# Patient Record
Sex: MALE | Race: WHITE | NOT HISPANIC OR LATINO | Employment: OTHER | URBAN - METROPOLITAN AREA
[De-identification: names, ages, dates, MRNs, and addresses within clinical notes are randomized per-mention and may not be internally consistent; named-entity substitution may affect disease eponyms.]

---

## 2017-01-05 ENCOUNTER — OFFICE VISIT (OUTPATIENT)
Dept: ENDOCRINOLOGY | Facility: CLINIC | Age: 82
End: 2017-01-05
Payer: MEDICARE

## 2017-01-05 VITALS
BODY MASS INDEX: 27.55 KG/M2 | WEIGHT: 207.88 LBS | DIASTOLIC BLOOD PRESSURE: 68 MMHG | SYSTOLIC BLOOD PRESSURE: 138 MMHG | HEIGHT: 73 IN | HEART RATE: 77 BPM

## 2017-01-05 DIAGNOSIS — I25.10 CORONARY ARTERY DISEASE, ANGINA PRESENCE UNSPECIFIED, UNSPECIFIED VESSEL OR LESION TYPE, UNSPECIFIED WHETHER NATIVE OR TRANSPLANTED HEART: ICD-10-CM

## 2017-01-05 DIAGNOSIS — G63 POLYNEUROPATHY ASSOCIATED WITH UNDERLYING DISEASE: ICD-10-CM

## 2017-01-05 DIAGNOSIS — E78.5 HYPERLIPIDEMIA, UNSPECIFIED HYPERLIPIDEMIA TYPE: ICD-10-CM

## 2017-01-05 DIAGNOSIS — E11.49 TYPE II DIABETES MELLITUS WITH NEUROLOGICAL MANIFESTATIONS: Primary | ICD-10-CM

## 2017-01-05 PROCEDURE — 99213 OFFICE O/P EST LOW 20 MIN: CPT | Mod: S$PBB,,, | Performed by: NURSE PRACTITIONER

## 2017-01-05 PROCEDURE — 99213 OFFICE O/P EST LOW 20 MIN: CPT | Mod: PBBFAC,PO | Performed by: NURSE PRACTITIONER

## 2017-01-05 PROCEDURE — 99999 PR PBB SHADOW E&M-EST. PATIENT-LVL III: CPT | Mod: PBBFAC,,, | Performed by: NURSE PRACTITIONER

## 2017-01-05 RX ORDER — HYDROCODONE BITARTRATE AND ACETAMINOPHEN 7.5; 325 MG/1; MG/1
TABLET ORAL
COMMUNITY
Start: 2016-12-09 | End: 2017-01-05 | Stop reason: SDUPTHER

## 2017-01-05 NOTE — MR AVS SNAPSHOT
[facility]  [address]  [facility] [address]  Phone: [phone]  Fax: [phone]                  William Mac   2017 9:30 AM   Office Visit    Description:  Male : 1935   Provider:  CLEMENTINA Verdugo,ANP-C   Department:  [facility]           Reason for Visit     Diabetes Mellitus           Diagnoses this Visit        Comments    Type II diabetes mellitus with neurological manifestations    -  Primary     Coronary artery disease, angina presence unspecified, unspecified vessel or lesion type, unspecified whether native or transplanted heart         Hyperlipidemia, unspecified hyperlipidemia type                To Do List           Goals (5 Years of Data)     None      [facility]     [facility] Nurse Care [facility] -  Assistance  Registered nurses in the [facility] provide clinical advisement, health education, appointment booking, and other advisory services.  Call for this free service at [phone].             Medications           Message regarding Medications     Verify the changes and/or additions to your medication regime listed below are the same as discussed with your clinician today.  If any of these changes or additions are incorrect, please notify your healthcare provider.        STOP taking these medications     cyanocobalamin, vitamin B-12, (VITAMIN B-12) 50 mcg tablet Take 2,500 mcg by mouth once daily.    hydrocodone-acetaminophen 7.5-325mg (NORCO) 7.5-325 mg per tablet     metformin (GLUCOPHAGE) 1000 MG tablet Take 1 tablet (1,000 mg total) by mouth 2 (two) times daily.    tramadol (ULTRAM) 50 mg tablet Take 50 mg by mouth every 6 (six) hours as needed for Pain.            Verify that the below list of medications is an accurate representation of the medications you are currently taking.  If none reported, the list may be blank. If incorrect, please contact your healthcare provider. Carry this list with you  "in case of emergency.           Current Medications     aspirin (ECOTRIN) 81 MG EC tablet Take 81 mg by mouth every other day.     cetirizine (ZYRTEC) 10 MG tablet Take 10 mg by mouth once daily.    docusate sodium (COLACE) 100 MG capsule Take 100 mg by mouth once daily.      hydrocodone-acetaminophen 5-325mg (NORCO) 5-325 mg per tablet Take 1 tablet by mouth every 6 (six) hours as needed for Pain.    IRON/MULTIVITS,STRESS FORMULA (MULTIVITAMIN, STRESS FORMULA ORAL) Take 1 tablet by mouth once daily.      pravastatin (PRAVACHOL) 40 MG tablet Take 1 tablet (40 mg total) by mouth nightly.    senna (SENOKOT) 8.6 mg tablet Take 2 tablets by mouth once daily.    sertraline (ZOLOFT) 50 MG tablet TAKE ONE TABLET BY MOUTH DAILY.           Clinical Reference Information           Vital Signs - Last Recorded  Most recent update: 1/5/2017  9:28 AM by Jessica Stahl LPN    BP Pulse Ht Wt BMI    138/68 (BP Location: Right arm, Patient Position: Sitting, BP Method: Manual) 77 6' 1" (1.854 m) 94.3 kg (207 lb 14.3 oz) 27.43 kg/m2      Blood Pressure          Most Recent Value    BP  138/68      Allergies as of 1/5/2017     Adhesive      Immunizations Administered on Date of Encounter - 1/5/2017     None      "

## 2017-01-05 NOTE — PROGRESS NOTES
Subjective:       Patient ID: William Mac is a 81 y.o. male.    Chief Complaint:  reconsulted for Uncontrolled DM.     HPI  Pt is a very pleasant 80 y/o wm with TYpe 2 DM since early 1990's, and chronic conditions pending review including  HLP, CAD. Does not have HTN--takes ACE for renal prophylaxis.  No c/o hypoglycemia symptoms.  He is only taking metformin.   BIggest issue is chronic back pain with prior stimulator placement.     Interim Events:    No acute events.  Back pain and now neck pain continues.  Rarely checks glucoses.  Memory loss is a concern but wife says its about the same as last visit.       On metformin 1000 bid,       Review of Systems   Constitutional: Negative for activity change, appetite change, fatigue and unexpected weight change.   HENT: Positive for hearing loss. Negative for trouble swallowing.    Eyes: Negative for photophobia and visual disturbance.   Respiratory: Negative for cough and shortness of breath.    Cardiovascular: Negative for chest pain, palpitations and leg swelling.   Gastrointestinal: Negative for constipation and diarrhea.   Genitourinary: Negative for difficulty urinating and frequency.   Musculoskeletal: Positive for back pain and neck stiffness. Negative for arthralgias and joint swelling.   Skin: Negative for rash and wound.   Neurological: Negative for dizziness, syncope, weakness and numbness.   Psychiatric/Behavioral: Negative for agitation. The patient is not nervous/anxious.        Objective:      Physical Exam   Constitutional: He is oriented to person, place, and time. He appears well-developed and well-nourished. No distress.   Appears approx age, well groomed, NAD   HENT:   Head: Normocephalic and atraumatic.   Nose: Nose normal.   Mouth/Throat: Oropharynx is clear and moist.   Eyes: Conjunctivae and EOM are normal. Pupils are equal, round, and reactive to light.   Neck: Normal range of motion. Neck supple. No tracheal deviation present. No thyromegaly  present.   Cardiovascular: Normal rate, regular rhythm, normal heart sounds and intact distal pulses.  Exam reveals no friction rub.    No murmur heard.  Pulmonary/Chest: Effort normal and breath sounds normal. No respiratory distress. He has no wheezes.   Musculoskeletal: Normal range of motion. He exhibits no edema.   Ambulates with cane.     Feet: no open wounds or calluses. Good pedal care     Protective Sensation (w/ 10 gram monofilament):  Right: Decreased  Left: Decreased    Visual Inspection:  Normal -  Bilateral    Pedal Pulses:   Right: Diminshed  Left: Diminshed    Posterior tibialis:   Right:Absent  Left: Absent     Lymphadenopathy:     He has no cervical adenopathy.   Neurological: He is alert and oriented to person, place, and time. He has normal reflexes. No cranial nerve deficit. He exhibits normal muscle tone. Coordination normal.   Vibratory sensation to feet intact bilaterally    Skin: Skin is warm and dry. No rash noted. He is not diaphoretic. No erythema.   Psychiatric: He has a normal mood and affect. His behavior is normal. Judgment and thought content normal.       Hemoglobin A1C   Date Value Ref Range Status   12/30/2016 7.4 (H) 4.5 - 6.2 % Final     Comment:     According to ADA guidelines, hemoglobin A1C <7.0% represents  optimal control in non-pregnant diabetic patients.  Different  metrics may apply to specific populations.   Standards of Medical Care in Diabetes - 2016.  For the purpose of screening for the presence of diabetes:  <5.7%     Consistent with the absence of diabetes  5.7-6.4%  Consistent with increasing risk for diabetes   (prediabetes)  >or=6.5%  Consistent with diabetes  Currently no consensus exists for use of hemoglobin A1C  for diagnosis of diabetes for children.     09/16/2016 7.1 (H) 4.5 - 6.2 % Final     Comment:     According to ADA guidelines, hemoglobin A1C <7.0% represents  optimal control in non-pregnant diabetic patients.  Different  metrics may apply to  specific populations.   Standards of Medical Care in Diabetes - 2016.  For the purpose of screening for the presence of diabetes:  <5.7%     Consistent with the absence of diabetes  5.7-6.4%  Consistent with increasing risk for diabetes   (prediabetes)  >or=6.5%  Consistent with diabetes  Currently no consensus exists for use of hemoglobin A1C  for diagnosis of diabetes for children.     08/07/2016 6.4 (H) 0.0 - 5.6 % Final     Comment:     Reference Interval:  5.0 - 5.6 Normal   5.7 - 6.4 High Risk   > 6.5 Diabetic    Hgb A1c results are standardized based on the (NGSP) National   Glycohemoglobin Standardization Program.    Hemoglobin A1C levels are related to mean serum/plasma glucose   during the preceding 2-3 months.            Chemistry        Component Value Date/Time     08/12/2016 0908    K 4.5 08/12/2016 0908     08/12/2016 0908    CO2 29 08/12/2016 0908    BUN 9 08/12/2016 0908    CREATININE 0.9 08/12/2016 0908     (H) 08/12/2016 0908        Component Value Date/Time    CALCIUM 10.0 08/12/2016 0908    ALKPHOS 61 08/12/2016 0908    AST 22 08/12/2016 0908    AST 29 01/14/2016 1135    ALT 24 08/12/2016 0908    BILITOT 0.7 08/12/2016 0908        Lab Results   Component Value Date    LDLCALC Invalid, Trig>400.0 08/07/2016     Lab Results   Component Value Date    TSH 1.441 05/20/2014       Assessment:     1. Type II diabetes mellitus with neurological manifestations  Chronic-stable-no change   2. Coronary artery disease, angina presence unspecified, unspecified vessel or lesion type, unspecified whether native or transplanted heart  -chronic-stable-avoid hypoglycemai    3. Hyperlipidemia, unspecified hyperlipidemia type  -chronic-stable-cont staing    4.       Peripheral neuropathy ----------chronic-stable-monitor foot care.       Plan:     continue metformin  1000 bid,  Cr 0.9.         ORDERS  1/5/17  A1C only in April same day as his wife--Cailin Mac     7 mo with a1c prior

## 2017-01-09 ENCOUNTER — TELEPHONE (OUTPATIENT)
Dept: INTERNAL MEDICINE | Facility: CLINIC | Age: 82
End: 2017-01-09

## 2017-01-09 RX ORDER — LORATADINE 10 MG/1
10 TABLET ORAL DAILY
Qty: 90 TABLET | Refills: 0 | Status: SHIPPED | OUTPATIENT
Start: 2017-01-09 | End: 2017-04-24 | Stop reason: SDUPTHER

## 2017-01-19 NOTE — TELEPHONE ENCOUNTER
Called and spoke with patient wife and informed her that medication claritin has been sent to pharmacy she verbalized that she understood she states that she needed the syringes not William

## 2017-02-09 PROBLEM — M54.9 BACK PAIN: Status: ACTIVE | Noted: 2017-02-09

## 2017-04-05 DIAGNOSIS — E11.9 TYPE 2 DIABETES MELLITUS WITHOUT COMPLICATION, WITHOUT LONG-TERM CURRENT USE OF INSULIN: Primary | ICD-10-CM

## 2017-04-24 DIAGNOSIS — F32.A DEPRESSION: ICD-10-CM

## 2017-04-26 RX ORDER — LORATADINE 10 MG/1
TABLET ORAL
Qty: 90 TABLET | Refills: 0 | Status: SHIPPED | OUTPATIENT
Start: 2017-04-26 | End: 2017-07-30 | Stop reason: SDUPTHER

## 2017-04-26 RX ORDER — SERTRALINE HYDROCHLORIDE 50 MG/1
TABLET, FILM COATED ORAL
Qty: 15 TABLET | Refills: 0 | Status: SHIPPED | OUTPATIENT
Start: 2017-04-26 | End: 2017-05-11 | Stop reason: SDUPTHER

## 2017-05-10 DIAGNOSIS — F32.A DEPRESSION: ICD-10-CM

## 2017-05-10 DIAGNOSIS — E78.5 HYPERLIPIDEMIA: ICD-10-CM

## 2017-05-10 RX ORDER — SERTRALINE HYDROCHLORIDE 50 MG/1
TABLET, FILM COATED ORAL
Qty: 15 TABLET | Refills: 0 | Status: CANCELLED | OUTPATIENT
Start: 2017-05-10

## 2017-05-10 RX ORDER — PRAVASTATIN SODIUM 40 MG/1
TABLET ORAL
Qty: 90 TABLET | Refills: 2 | Status: SHIPPED | OUTPATIENT
Start: 2017-05-10 | End: 2017-08-17 | Stop reason: SDUPTHER

## 2017-05-11 ENCOUNTER — OFFICE VISIT (OUTPATIENT)
Dept: INTERNAL MEDICINE | Facility: CLINIC | Age: 82
End: 2017-05-11
Payer: MEDICARE

## 2017-05-11 VITALS
HEART RATE: 74 BPM | BODY MASS INDEX: 27.61 KG/M2 | OXYGEN SATURATION: 95 % | HEIGHT: 73 IN | DIASTOLIC BLOOD PRESSURE: 70 MMHG | RESPIRATION RATE: 16 BRPM | WEIGHT: 208.31 LBS | SYSTOLIC BLOOD PRESSURE: 118 MMHG

## 2017-05-11 DIAGNOSIS — E11.69 HYPERLIPIDEMIA DUE TO TYPE 2 DIABETES MELLITUS: Primary | ICD-10-CM

## 2017-05-11 DIAGNOSIS — F32.A DEPRESSION, UNSPECIFIED DEPRESSION TYPE: ICD-10-CM

## 2017-05-11 DIAGNOSIS — G89.4 CHRONIC PAIN SYNDROME: ICD-10-CM

## 2017-05-11 DIAGNOSIS — Z95.1 S/P CABG (CORONARY ARTERY BYPASS GRAFT): ICD-10-CM

## 2017-05-11 DIAGNOSIS — E78.5 HYPERLIPIDEMIA DUE TO TYPE 2 DIABETES MELLITUS: Primary | ICD-10-CM

## 2017-05-11 DIAGNOSIS — D69.6 THROMBOCYTOPENIA: ICD-10-CM

## 2017-05-11 PROCEDURE — 99214 OFFICE O/P EST MOD 30 MIN: CPT | Mod: S$PBB,,, | Performed by: INTERNAL MEDICINE

## 2017-05-11 PROCEDURE — 99213 OFFICE O/P EST LOW 20 MIN: CPT | Mod: PBBFAC,PO | Performed by: INTERNAL MEDICINE

## 2017-05-11 PROCEDURE — 99999 PR PBB SHADOW E&M-EST. PATIENT-LVL III: CPT | Mod: PBBFAC,,, | Performed by: INTERNAL MEDICINE

## 2017-05-11 RX ORDER — SERTRALINE HYDROCHLORIDE 50 MG/1
50 TABLET, FILM COATED ORAL DAILY
Qty: 90 TABLET | Refills: 1 | Status: SHIPPED | OUTPATIENT
Start: 2017-05-11 | End: 2017-08-17 | Stop reason: SDUPTHER

## 2017-05-11 RX ORDER — HYDROCODONE BITARTRATE AND ACETAMINOPHEN 7.5; 325 MG/1; MG/1
TABLET ORAL
COMMUNITY
Start: 2017-04-26 | End: 2017-05-11

## 2017-05-11 NOTE — MR AVS SNAPSHOT
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    -  Primary     Thrombocytopenia         Depression, unspecified depression type         S/P CABG (coronary artery bypass graft)         Chronic pain syndrome                To Do List           Future Appointments        Provider Department Dept Phone    2017 8:30 AM LAB, [place] [facility] [phone]    2017 10:00 AM LAB, [place] [facility] [phone]    2017 9:30 AM CLEMENTINA Verdugo,ANP-C [facility] [phone]    2017 11:00 AM Quita Thompson MD [facility] [phone]      Goals (5 Years of Data)     None      Follow-Up and Disposition     Return in about 6 months (around 2017).       These Medications        Disp Refills Start End    sertraline (ZOLOFT) 50 MG tablet 90 tablet 1 2017     Take 1 tablet (50 mg total) by mouth once daily. - Oral    Pharmacy: [id number] [address] [place] [address], [address]  #: [phone]         [facility]     [facility] Nurse Care [facility] -  Assistance  Unless otherwise directed by your provider, please contact [facility], our nurse care line that is available for  assistance.     Registered nurses in the [facility] provide: appointment scheduling, clinical advisement, health education, and other advisory services.  Call: [phone] (toll free)               Medications           Message regarding Medications     Verify the changes and/or additions to your  "medication regime listed below are the same as discussed with your clinician today.  If any of these changes or additions are incorrect, please notify your healthcare provider.        CHANGE how you are taking these medications     Start Taking Instead of    sertraline (ZOLOFT) 50 MG tablet sertraline (ZOLOFT) 50 MG tablet    Dosage:  Take 1 tablet (50 mg total) by mouth once daily. Dosage:  TAKE ONE TABLET BY MOUTH DAILY.    Reason for Change:  Reorder       STOP taking these medications     hydrocodone-acetaminophen 7.5-325mg (NORCO) 7.5-325 mg per tablet            Verify that the below list of medications is an accurate representation of the medications you are currently taking.  If none reported, the list may be blank. If incorrect, please contact your healthcare provider. Carry this list with you in case of emergency.           Current Medications     aspirin (ECOTRIN) 81 MG EC tablet Take 81 mg by mouth every other day.     docusate sodium (COLACE) 100 MG capsule Take 100 mg by mouth once daily.      hydrocodone-acetaminophen 5-325mg (NORCO) 5-325 mg per tablet Take 1 tablet by mouth every 6 (six) hours as needed for Pain.    IRON/MULTIVITS,STRESS FORMULA (MULTIVITAMIN, STRESS FORMULA ORAL) Take 1 tablet by mouth once daily.      loratadine (CLARITIN) 10 mg tablet TAKE ONE TABLET BY MOUTH DAILY.    metformin (GLUCOPHAGE) 1000 MG tablet Take 1,000 mg by mouth 2 (two) times daily with meals.    pravastatin (PRAVACHOL) 40 MG tablet TAKE ONE TABLET BY MOUTH NIGHTLY    senna (SENOKOT) 8.6 mg tablet Take 2 tablets by mouth once daily.    sertraline (ZOLOFT) 50 MG tablet Take 1 tablet (50 mg total) by mouth once daily.           Clinical Reference Information           Your Vitals Were     BP Pulse Resp Height Weight SpO2    118/70 74 16 6' 1" (1.854 m) 94.5 kg (208 lb 5.4 oz) 95%    BMI                27.49 kg/m2          Blood Pressure          Most Recent Value    BP  118/70      Allergies as of 5/11/2017     " Adhesive      Immunizations Administered on Date of Encounter - 5/11/2017     None      Orders Placed During Today's Visit      Normal Orders This Visit    Ambulatory referral to Cardiology     Microalbumin/creatinine urine ratio     Future Labs/Procedures Expected by Expires    C-reactive protein  5/11/2017 7/10/2018    CBC auto differential  5/11/2017 8/9/2017    Comprehensive metabolic panel  5/11/2017 8/9/2017    Hemoglobin A1c  5/11/2017 8/9/2017    Lipid panel  5/11/2017 5/12/2018    Sedimentation rate, manual  5/11/2017 8/9/2017    TSH  5/11/2017 5/12/2018      Language Assistance Services     ATTENTION: Language assistance services are available, free of charge. Please call [phone].      ATENCIÓN: Si habla dana, tiene a alvarez disposición servicios gratuitos de asistencia lingüística. Llame al [phone].     CHÚ Ý: N?u b?n nói Ti?ng Vi?t, có các d?ch v? h? tr? ngôn ng? mi?n phí dành cho b?n. G?i s? [phone].         [facility] complies with applicable Federal civil rights laws and does not discriminate on the basis of race, color, national origin, age, disability, or sex.

## 2017-05-11 NOTE — PROGRESS NOTES
HISTORY OF PRESENT ILLNESS:  Pt. is a 81 y.o. male presents for monitoring of his DM Type 2 with neuropathy, HTN, mixed hyperlipdiemia, CAD.  Does not have HTN--takes ACE for renal prophylaxis.  Eye exam is with Dr. Drew, wife says they are making appt. Soon.  HE is still having problems with his back stimulator.  States there sertraline iis working well.    Lab Results   Component Value Date    WBC 5.46 02/09/2017    HGB 14.8 02/09/2017    HCT 43.6 02/09/2017     (L) 02/09/2017    CHOL 150 08/07/2016    TRIG 541 (H) 08/07/2016    HDL 25 (L) 08/07/2016    ALT 24 08/12/2016    AST 22 08/12/2016     08/12/2016    K 4.5 08/12/2016     08/12/2016    CREATININE 0.9 08/12/2016    BUN 9 08/12/2016    CO2 29 08/12/2016    TSH 1.441 05/20/2014    PSA 1.01 05/22/2013    INR 1.0 02/09/2017    GLUF 185 (H) 03/12/2007    HGBA1C 7.4 (H) 12/30/2016     Lab Results   Component Value Date    LDLCALC Invalid, Trig>400.0 08/07/2016             ROS:  GENERAL: No fever, chills. positive fatigability; no weight loss.  SKIN: No rashes, itching or changes in color or texture of skin.  HEAD: No headaches or recent head trauma.  EARS: Denies ear pain, discharge or vertigo.  NOSE: No loss of smell, no epistaxis or postnasal drip.  MOUTH & THROAT: No hoarseness or change in voice. No excessive gum bleeding.  NODES: Denies swollen glands.  CHEST: Denies DEL VALLE, cyanosis, wheezing, cough and sputum production.  CARDIOVASCULAR: Denies chest pain, PND, orthopnea or reduced exercise tolerance.  ABDOMEN: Appetite fine. No weight loss. Denies constipation, diarrhea, abdominal pain, hematemesis or blood in stool.  URINARY: No flank pain, dysuria or hematuria.  PERIPHERAL VASCULAR: No claudication or cyanosis. No edema.  MUSCULOSKELETAL: No joint stiffness or swelling. Positive back pain.  NEUROLOGIC: Denies numbness    PE:   Vitals:   Vitals:    05/11/17 1705   Pulse: 74   Resp: 16     GENERAL: no acute distress, A&Ox3,  comfortable.  Male with BMI of 27   HEENT: tympanic membranes clear, nasal mucosa pink, no pharyngeal erythema or exudate  NECK: supple, no cervical lymphadenopathy, no thyromegaly; no supraclavicular nodes;   CHEST:  clear to auscultation bilaterally, no crackles or wheeze; no increased work of breathing;  CARDIOVASCULAR: regular rate and rhythm, no rubs, murmurs or gallops.  ABDOMEN: normal bowel sounds, soft non-tender, non-distended; no palpable organomegaly;   EXT: no clubbing, cyanosis or edema.     ASSESSMENT/PLAN:    Hyperlipidemia due to type 2 diabetes mellitus  -     Hemoglobin A1c; Future; Expected date: 5/11/17  -     Comprehensive metabolic panel; Future; Expected date: 5/11/17  -     Lipid panel; Future; Expected date: 5/11/17  -     Microalbumin/creatinine urine ratio    Thrombocytopenia  -     CBC auto differential; Future; Expected date: 5/11/17    Depression, unspecified depression type  -     TSH; Future; Expected date: 5/11/17  -     sertraline (ZOLOFT) 50 MG tablet; Take 1 tablet (50 mg total) by mouth once daily.  Dispense: 90 tablet; Refill: 1    S/P CABG (coronary artery bypass graft)  -     Ambulatory referral to Cardiology    Chronic pain syndrome  -     Sedimentation rate, manual; Future; Expected date: 5/11/17  -     C-reactive protein; Future; Expected date: 5/11/17      Call if condition changes or worsens.

## 2017-05-14 PROBLEM — D69.6 THROMBOCYTOPENIA: Status: ACTIVE | Noted: 2017-05-14

## 2017-05-14 PROBLEM — E11.69 HYPERLIPIDEMIA DUE TO TYPE 2 DIABETES MELLITUS: Status: ACTIVE | Noted: 2017-05-14

## 2017-05-14 PROBLEM — G89.4 CHRONIC PAIN SYNDROME: Status: ACTIVE | Noted: 2017-05-14

## 2017-05-14 PROBLEM — E78.5 HYPERLIPIDEMIA DUE TO TYPE 2 DIABETES MELLITUS: Status: ACTIVE | Noted: 2017-05-14

## 2017-05-16 ENCOUNTER — LAB VISIT (OUTPATIENT)
Dept: LAB | Facility: HOSPITAL | Age: 82
End: 2017-05-16
Attending: INTERNAL MEDICINE
Payer: MEDICARE

## 2017-05-16 DIAGNOSIS — D69.6 THROMBOCYTOPENIA: ICD-10-CM

## 2017-05-16 DIAGNOSIS — G89.4 CHRONIC PAIN SYNDROME: ICD-10-CM

## 2017-05-16 DIAGNOSIS — E11.69 HYPERLIPIDEMIA DUE TO TYPE 2 DIABETES MELLITUS: ICD-10-CM

## 2017-05-16 DIAGNOSIS — F32.A DEPRESSION, UNSPECIFIED DEPRESSION TYPE: ICD-10-CM

## 2017-05-16 DIAGNOSIS — E78.5 HYPERLIPIDEMIA DUE TO TYPE 2 DIABETES MELLITUS: ICD-10-CM

## 2017-05-16 LAB
ALBUMIN SERPL BCP-MCNC: 4 G/DL
ALP SERPL-CCNC: 53 U/L
ALT SERPL W/O P-5'-P-CCNC: 22 U/L
ANION GAP SERPL CALC-SCNC: 11 MMOL/L
AST SERPL-CCNC: 26 U/L
BASOPHILS # BLD AUTO: 0.02 K/UL
BASOPHILS NFR BLD: 0.4 %
BILIRUB SERPL-MCNC: 0.7 MG/DL
BUN SERPL-MCNC: 10 MG/DL
CALCIUM SERPL-MCNC: 9.6 MG/DL
CHLORIDE SERPL-SCNC: 106 MMOL/L
CHOLEST/HDLC SERPL: 4.9 {RATIO}
CO2 SERPL-SCNC: 27 MMOL/L
CREAT SERPL-MCNC: 0.9 MG/DL
CRP SERPL-MCNC: 0.4 MG/L
DIFFERENTIAL METHOD: ABNORMAL
EOSINOPHIL # BLD AUTO: 0.4 K/UL
EOSINOPHIL NFR BLD: 7.1 %
ERYTHROCYTE [DISTWIDTH] IN BLOOD BY AUTOMATED COUNT: 13 %
ERYTHROCYTE [SEDIMENTATION RATE] IN BLOOD BY WESTERGREN METHOD: 1 MM/HR
EST. GFR  (AFRICAN AMERICAN): >60 ML/MIN/1.73 M^2
EST. GFR  (NON AFRICAN AMERICAN): >60 ML/MIN/1.73 M^2
GLUCOSE SERPL-MCNC: 130 MG/DL
HCT VFR BLD AUTO: 44.7 %
HDL/CHOLESTEROL RATIO: 20.6 %
HDLC SERPL-MCNC: 136 MG/DL
HDLC SERPL-MCNC: 28 MG/DL
HGB BLD-MCNC: 15.4 G/DL
LDLC SERPL CALC-MCNC: 68 MG/DL
LYMPHOCYTES # BLD AUTO: 1.7 K/UL
LYMPHOCYTES NFR BLD: 31.1 %
MCH RBC QN AUTO: 32.5 PG
MCHC RBC AUTO-ENTMCNC: 34.5 %
MCV RBC AUTO: 94 FL
MONOCYTES # BLD AUTO: 0.6 K/UL
MONOCYTES NFR BLD: 11.5 %
NEUTROPHILS # BLD AUTO: 2.7 K/UL
NEUTROPHILS NFR BLD: 49.5 %
NONHDLC SERPL-MCNC: 108 MG/DL
PLATELET # BLD AUTO: 124 K/UL
PMV BLD AUTO: 9.3 FL
POTASSIUM SERPL-SCNC: 4.3 MMOL/L
PROT SERPL-MCNC: 7.1 G/DL
RBC # BLD AUTO: 4.74 M/UL
SODIUM SERPL-SCNC: 144 MMOL/L
TRIGL SERPL-MCNC: 200 MG/DL
TSH SERPL DL<=0.005 MIU/L-ACNC: 1.58 UIU/ML
WBC # BLD AUTO: 5.47 K/UL

## 2017-05-16 PROCEDURE — 86140 C-REACTIVE PROTEIN: CPT

## 2017-05-16 PROCEDURE — 83036 HEMOGLOBIN GLYCOSYLATED A1C: CPT

## 2017-05-16 PROCEDURE — 85025 COMPLETE CBC W/AUTO DIFF WBC: CPT

## 2017-05-16 PROCEDURE — 36415 COLL VENOUS BLD VENIPUNCTURE: CPT | Mod: PO

## 2017-05-16 PROCEDURE — 80053 COMPREHEN METABOLIC PANEL: CPT

## 2017-05-16 PROCEDURE — 80061 LIPID PANEL: CPT

## 2017-05-16 PROCEDURE — 85651 RBC SED RATE NONAUTOMATED: CPT | Mod: PO

## 2017-05-16 PROCEDURE — 84443 ASSAY THYROID STIM HORMONE: CPT

## 2017-05-17 LAB
ESTIMATED AVG GLUCOSE: 166 MG/DL
HBA1C MFR BLD HPLC: 7.4 %

## 2017-05-18 ENCOUNTER — TELEPHONE (OUTPATIENT)
Dept: ADMINISTRATIVE | Facility: HOSPITAL | Age: 82
End: 2017-05-18

## 2017-05-18 NOTE — LETTER
June 1, 2017    Dr Louis Drew             [facility]  [address]  [facility] [address]  Phone: [phone] June 1, 2017     Patient: William Mac    YOB: 1935   Date of Visit: 5/18/2017       To Whom It May Concern:                                                                                    [facility]    We are seeing William Mac in the clinic today at [facility].  Quita Thompson MD is their PCP.  She/He has an outstanding lab/procedure at this time when reviewing their chart.  To help with our Health Maintenance records will you please supply the following:      [x]  Eye Exam    (3rd request)                                     Please Fax to [facility] at [phone]    Thank you for your help, Itzel Morales LPN-GERALD.  If I can be of any assistance you can call at [phone]        [facility]         If you have any questions or concerns, please don't hesitate to call.    Sincerely,        Quita Thompson MD

## 2017-05-18 NOTE — LETTER
May 22, 2017    Dr Louis Drew             [facility]  [address]  [facility] [address]  Phone: [phone] May 22, 2017     Patient: William Mac    YOB: 1935   Date of Visit: 5/18/2017       To Whom It May Concern:                                                                                    [facility]    We are seeing William Mac in the clinic today at [facility].  Quita Thompson MD is their PCP.  She/He has an outstanding lab/procedure at this time when reviewing their chart.  To help with our Health Maintenance records will you please supply the following:          [x]  Eye Exam                                            Please Fax to [facility] at [phone]    Thank you for your help, Itzel Morales LPN-[facility].  If I can be of any assistance you can call at [phone]        [facility]         If you have any questions or concerns, please don't hesitate to call.    Sincerely,        Quita Thompson MD

## 2017-05-18 NOTE — LETTER
May 18, 2017    Dr Louis Drew             [facility]  [address]  [facility] [address]  Phone: [phone] May 18, 2017     Patient: William Mac    YOB: 1935   Date of Visit: 5/18/2017       To Whom It May Concern:                                                                                    [facility]    We are seeing William Mac in the clinic today at [facility].  Quita Thompson MD is their PCP.  She/He has an outstanding lab/procedure at this time when reviewing their chart.  To help with our Health Maintenance records will you please supply the following:      [x]  Eye Exam                                      Please Fax to [facility] at [phone]    Thank you for your help, Itzel Morales LPN-[facility].  If I can be of any assistance you can call at [phone]        [facility]         If you have any questions or concerns, please don't hesitate to call.    Sincerely,        Quita Thompson MD

## 2017-07-31 ENCOUNTER — OFFICE VISIT (OUTPATIENT)
Dept: CARDIOLOGY | Facility: CLINIC | Age: 82
End: 2017-07-31
Payer: MEDICARE

## 2017-07-31 VITALS
HEART RATE: 74 BPM | BODY MASS INDEX: 27.37 KG/M2 | SYSTOLIC BLOOD PRESSURE: 110 MMHG | DIASTOLIC BLOOD PRESSURE: 60 MMHG | WEIGHT: 207.44 LBS

## 2017-07-31 DIAGNOSIS — E78.5 TYPE 2 DIABETES MELLITUS WITH HYPERLIPIDEMIA: ICD-10-CM

## 2017-07-31 DIAGNOSIS — Z95.1 S/P CABG (CORONARY ARTERY BYPASS GRAFT): ICD-10-CM

## 2017-07-31 DIAGNOSIS — E11.69 HYPERLIPIDEMIA DUE TO TYPE 2 DIABETES MELLITUS: ICD-10-CM

## 2017-07-31 DIAGNOSIS — I63.9 CEREBROVASCULAR ACCIDENT (CVA), UNSPECIFIED MECHANISM: ICD-10-CM

## 2017-07-31 DIAGNOSIS — E78.5 HYPERLIPIDEMIA DUE TO TYPE 2 DIABETES MELLITUS: ICD-10-CM

## 2017-07-31 DIAGNOSIS — I73.9 PVD (PERIPHERAL VASCULAR DISEASE): ICD-10-CM

## 2017-07-31 DIAGNOSIS — E11.69 TYPE 2 DIABETES MELLITUS WITH HYPERLIPIDEMIA: ICD-10-CM

## 2017-07-31 DIAGNOSIS — I25.10 CORONARY ARTERY DISEASE, ANGINA PRESENCE UNSPECIFIED, UNSPECIFIED VESSEL OR LESION TYPE, UNSPECIFIED WHETHER NATIVE OR TRANSPLANTED HEART: Primary | ICD-10-CM

## 2017-07-31 PROCEDURE — 1126F AMNT PAIN NOTED NONE PRSNT: CPT | Mod: ,,, | Performed by: INTERNAL MEDICINE

## 2017-07-31 PROCEDURE — 99999 PR PBB SHADOW E&M-EST. PATIENT-LVL II: CPT | Mod: PBBFAC,,, | Performed by: INTERNAL MEDICINE

## 2017-07-31 PROCEDURE — 1159F MED LIST DOCD IN RCRD: CPT | Mod: ,,, | Performed by: INTERNAL MEDICINE

## 2017-07-31 PROCEDURE — 1157F ADVNC CARE PLAN IN RCRD: CPT | Mod: ,,, | Performed by: INTERNAL MEDICINE

## 2017-07-31 PROCEDURE — 99214 OFFICE O/P EST MOD 30 MIN: CPT | Mod: S$PBB,,, | Performed by: INTERNAL MEDICINE

## 2017-07-31 PROCEDURE — 99212 OFFICE O/P EST SF 10 MIN: CPT | Mod: PBBFAC,PO | Performed by: INTERNAL MEDICINE

## 2017-07-31 NOTE — LETTER
July 31, 2017      Quita Thompson MD  [address] [facility] [place] [address]           [facility]  [address] [place] [address]  Phone: [phone]          Patient: William Mac   MR Number: [id number]   YOB: 1935   Date of Visit: 7/31/2017       Dear Dr. Quita Thompson:    Thank you for referring William Mac to me for evaluation. Attached you will find relevant portions of my assessment and plan of care.    If you have questions, please do not hesitate to call me. I look forward to following William Mac along with you.    Sincerely,    Ha Heredia MD    Enclosure  CC:  No Recipients    If you would like to receive this communication electronically, please contact [email or web address] or [phone] to request more information on [employer] [facility] access.    For providers and/or their staff who would like to refer a patient to [facility], please contact us through our one-stop-shop provider referral line, [facility], at [phone].    If you feel you have received this communication in error or would no longer like to receive these types of communications, please e-mail [email or web address]

## 2017-08-01 RX ORDER — LORATADINE 10 MG/1
TABLET ORAL
Qty: 90 TABLET | Refills: 0 | Status: SHIPPED | OUTPATIENT
Start: 2017-08-01 | End: 2017-08-17 | Stop reason: SDUPTHER

## 2017-08-04 ENCOUNTER — TELEPHONE (OUTPATIENT)
Dept: CARDIOLOGY | Facility: CLINIC | Age: 82
End: 2017-08-04

## 2017-08-04 ENCOUNTER — HOSPITAL ENCOUNTER (OUTPATIENT)
Dept: RADIOLOGY | Facility: HOSPITAL | Age: 82
Discharge: HOME | End: 2017-08-04
Attending: INTERNAL MEDICINE
Payer: MEDICARE

## 2017-08-04 DIAGNOSIS — I63.9 CEREBROVASCULAR ACCIDENT (CVA), UNSPECIFIED MECHANISM: ICD-10-CM

## 2017-08-04 PROCEDURE — 70470 CT HEAD/BRAIN W/O & W/DYE: CPT | Mod: 26,,, | Performed by: RADIOLOGY

## 2017-08-04 PROCEDURE — 25500020 PHARM REV CODE 255: Mod: PO | Performed by: INTERNAL MEDICINE

## 2017-08-04 PROCEDURE — 70470 CT HEAD/BRAIN W/O & W/DYE: CPT | Mod: TC,PO

## 2017-08-04 RX ADMIN — IOHEXOL 75 ML: 350 INJECTION, SOLUTION INTRAVENOUS at 08:08

## 2017-08-04 NOTE — TELEPHONE ENCOUNTER
----- Message from Ha Heredia MD sent at 8/4/2017  9:52 AM CDT -----  No evidence recent stroke  Follow up with pcp

## 2017-08-09 ENCOUNTER — HOSPITAL ENCOUNTER (OUTPATIENT)
Dept: RADIOLOGY | Facility: HOSPITAL | Age: 82
Discharge: HOME | End: 2017-08-09
Attending: INTERNAL MEDICINE
Payer: MEDICARE

## 2017-08-09 ENCOUNTER — TELEPHONE (OUTPATIENT)
Dept: FAMILY MEDICINE | Facility: CLINIC | Age: 82
End: 2017-08-09

## 2017-08-09 ENCOUNTER — OFFICE VISIT (OUTPATIENT)
Dept: INTERNAL MEDICINE | Facility: CLINIC | Age: 82
End: 2017-08-09
Payer: MEDICARE

## 2017-08-09 VITALS
OXYGEN SATURATION: 92 % | SYSTOLIC BLOOD PRESSURE: 112 MMHG | RESPIRATION RATE: 18 BRPM | DIASTOLIC BLOOD PRESSURE: 70 MMHG | HEART RATE: 84 BPM | WEIGHT: 205.5 LBS | BODY MASS INDEX: 27.11 KG/M2

## 2017-08-09 DIAGNOSIS — R29.898 WEAKNESS OF LEFT HIP: Primary | ICD-10-CM

## 2017-08-09 DIAGNOSIS — R26.9 NEUROLOGIC GAIT DYSFUNCTION: ICD-10-CM

## 2017-08-09 DIAGNOSIS — R29.898 WEAKNESS OF LEFT HIP: ICD-10-CM

## 2017-08-09 PROCEDURE — 73502 X-RAY EXAM HIP UNI 2-3 VIEWS: CPT | Mod: 26,LT,, | Performed by: RADIOLOGY

## 2017-08-09 PROCEDURE — 99999 PR PBB SHADOW E&M-EST. PATIENT-LVL IV: CPT | Mod: PBBFAC,,, | Performed by: INTERNAL MEDICINE

## 2017-08-09 PROCEDURE — 3078F DIAST BP <80 MM HG: CPT | Mod: ,,, | Performed by: INTERNAL MEDICINE

## 2017-08-09 PROCEDURE — 1159F MED LIST DOCD IN RCRD: CPT | Mod: ,,, | Performed by: INTERNAL MEDICINE

## 2017-08-09 PROCEDURE — 1126F AMNT PAIN NOTED NONE PRSNT: CPT | Mod: ,,, | Performed by: INTERNAL MEDICINE

## 2017-08-09 PROCEDURE — 73502 X-RAY EXAM HIP UNI 2-3 VIEWS: CPT | Mod: TC,PO,LT

## 2017-08-09 PROCEDURE — 1157F ADVNC CARE PLAN IN RCRD: CPT | Mod: ,,, | Performed by: INTERNAL MEDICINE

## 2017-08-09 PROCEDURE — 3008F BODY MASS INDEX DOCD: CPT | Mod: ,,, | Performed by: INTERNAL MEDICINE

## 2017-08-09 PROCEDURE — 3074F SYST BP LT 130 MM HG: CPT | Mod: ,,, | Performed by: INTERNAL MEDICINE

## 2017-08-09 PROCEDURE — 99213 OFFICE O/P EST LOW 20 MIN: CPT | Mod: S$PBB,,, | Performed by: INTERNAL MEDICINE

## 2017-08-09 RX ORDER — PRAVASTATIN SODIUM 40 MG/1
TABLET ORAL
COMMUNITY
Start: 2017-05-10 | End: 2017-12-27 | Stop reason: SDUPTHER

## 2017-08-09 RX ORDER — SENNOSIDES 8.6 MG/1
2 TABLET ORAL DAILY PRN
COMMUNITY
End: 2020-01-01 | Stop reason: SDUPTHER

## 2017-08-09 RX ORDER — LORATADINE 10 MG/1
TABLET ORAL
COMMUNITY
Start: 2017-08-01 | End: 2017-11-15 | Stop reason: SDUPTHER

## 2017-08-09 RX ORDER — HYDROCODONE BITARTRATE AND ACETAMINOPHEN 7.5; 325 MG/1; MG/1
TABLET ORAL
COMMUNITY
Start: 2017-08-04 | End: 2018-08-01 | Stop reason: DRUGHIGH

## 2017-08-09 RX ORDER — HYDROCODONE BITARTRATE AND ACETAMINOPHEN 5; 325 MG/1; MG/1
1 TABLET ORAL EVERY 8 HOURS PRN
COMMUNITY
End: 2020-01-01

## 2017-08-09 RX ORDER — METFORMIN HYDROCHLORIDE 1000 MG/1
1000 TABLET ORAL
COMMUNITY
End: 2017-11-02 | Stop reason: SDUPTHER

## 2017-08-09 RX ORDER — SERTRALINE HYDROCHLORIDE 50 MG/1
50 TABLET, FILM COATED ORAL
COMMUNITY
Start: 2017-05-11 | End: 2017-12-08 | Stop reason: SDUPTHER

## 2017-08-09 RX ORDER — ASPIRIN 81 MG/1
81 TABLET ORAL EVERY OTHER DAY
COMMUNITY
End: 2020-01-01

## 2017-08-09 RX ORDER — DOCUSATE SODIUM 100 MG/1
100 CAPSULE, LIQUID FILLED ORAL
COMMUNITY
End: 2018-11-14

## 2017-08-09 NOTE — PROGRESS NOTES
"HISTORY OF PRESENT ILLNESS:  Pt. is a 81 y.o. male presents after his cardiologist ordered a CT of his head, due to unsteady gait/left leg weakness recently.  He described weak hip flexors.  He had hip replacement done 9/06 at [facility].    "  There is mild ventricular megaly most likely limited central atrophy.  There is no evidence of abnormal enhancement.  There is patchy sphenoid sinus mucosal thickening.  There is no significant change relative to the 03/09/2014 exam.   Impression      No acute intracranial findings in this patient with extensive white matter changes and suggestion of central atrophy.    Patchy ethmoid and sphenoid sinus mucosal thickening is evident which was not seen on the 03/09/2014 exam."           ROS:  GENERAL: No fever, chills, fatigability or weight loss.  SKIN: No rashes, itching or changes in color or texture of skin.  HEAD: No headaches or recent head trauma.  EARS: Denies ear pain, discharge or vertigo.  NOSE: No loss of smell, no epistaxis or postnasal drip.  MOUTH & THROAT: No hoarseness or change in voice. No excessive gum bleeding.  NODES: Denies swollen glands.  CHEST: Denies DEL VALLE, cyanosis, wheezing, cough and sputum production.  CARDIOVASCULAR: Denies chest pain, PND, orthopnea or reduced exercise tolerance.  ABDOMEN: Appetite fine. No weight loss. Denies constipation, diarrhea, abdominal pain, hematemesis or blood in stool.  URINARY: No flank pain, dysuria or hematuria.  PERIPHERAL VASCULAR: No claudication or cyanosis. No edema.  MUSCULOSKELETAL: Positive joint stiffness to left hip; no swelling. Denies back pain.  NEUROLOGIC: Denies numbness; gait dysfunction; feels it difficult to lift left leg/feels heavy;    PE:   Vitals:   Vitals:    08/09/17 1657   Pulse: 84   Resp: 18     GENERAL: no acute distress, A&Ox3, comfortable.  Male with BMI of 27   HEENT: tympanic membranes clear, nasal mucosa pink, no pharyngeal erythema or exudate  NECK: supple, no cervical lymphadenopathy, no " thyromegaly; no supraclavicular nodes;   CHEST:  clear to auscultation bilaterally, no crackles or wheeze; no increased work of breathing;  CARDIOVASCULAR: regular rate and rhythm, no rubs, murmurs or gallops.  ABDOMEN: normal bowel sounds, soft non-tender, non-distended; no palpable organomegaly;   EXT: no clubbing, cyanosis or edema; positive weakness hip flexor on left;    ASSESSMENT/PLAN:    Weakness of left hip  -     X-Ray Hip 2 View Left; Future; Expected date: 08/09/2017  -     Ambulatory consult to Physical Therapy  -     Ambulatory referral to Neurology    Neurologic gait dysfunction  -     Ambulatory consult to Physical Therapy  -     Ambulatory referral to Neurology      Call if condition changes or worsens.

## 2017-08-11 NOTE — TELEPHONE ENCOUNTER
----- Message from Kenya Marques sent at 8/11/2017  1:52 PM CDT -----  Contact: spouse  Returning call, results  Call back on # [phone]  thanks

## 2017-08-12 PROBLEM — R26.9 NEUROLOGIC GAIT DYSFUNCTION: Status: ACTIVE | Noted: 2017-08-12

## 2017-08-12 PROBLEM — R29.898 WEAKNESS OF LEFT HIP: Status: ACTIVE | Noted: 2017-08-12

## 2017-08-17 ENCOUNTER — OFFICE VISIT (OUTPATIENT)
Dept: ENDOCRINOLOGY | Facility: CLINIC | Age: 82
End: 2017-08-17
Payer: MEDICARE

## 2017-08-17 VITALS
DIASTOLIC BLOOD PRESSURE: 70 MMHG | BODY MASS INDEX: 27.39 KG/M2 | HEART RATE: 64 BPM | RESPIRATION RATE: 24 BRPM | WEIGHT: 206.63 LBS | SYSTOLIC BLOOD PRESSURE: 120 MMHG | HEIGHT: 73 IN

## 2017-08-17 DIAGNOSIS — E78.5 HYPERLIPIDEMIA, UNSPECIFIED HYPERLIPIDEMIA TYPE: ICD-10-CM

## 2017-08-17 DIAGNOSIS — L60.0 INGROWN TOENAIL: ICD-10-CM

## 2017-08-17 DIAGNOSIS — I25.10 CORONARY ARTERY DISEASE, ANGINA PRESENCE UNSPECIFIED, UNSPECIFIED VESSEL OR LESION TYPE, UNSPECIFIED WHETHER NATIVE OR TRANSPLANTED HEART: ICD-10-CM

## 2017-08-17 DIAGNOSIS — E11.49 TYPE II DIABETES MELLITUS WITH NEUROLOGICAL MANIFESTATIONS: Primary | ICD-10-CM

## 2017-08-17 DIAGNOSIS — G63 POLYNEUROPATHY ASSOCIATED WITH UNDERLYING DISEASE: ICD-10-CM

## 2017-08-17 PROCEDURE — 99999 PR PBB SHADOW E&M-EST. PATIENT-LVL IV: CPT | Mod: PBBFAC,,, | Performed by: NURSE PRACTITIONER

## 2017-08-17 PROCEDURE — 1126F AMNT PAIN NOTED NONE PRSNT: CPT | Mod: ,,, | Performed by: NURSE PRACTITIONER

## 2017-08-17 PROCEDURE — 99214 OFFICE O/P EST MOD 30 MIN: CPT | Mod: PBBFAC,PO | Performed by: NURSE PRACTITIONER

## 2017-08-17 PROCEDURE — 99214 OFFICE O/P EST MOD 30 MIN: CPT | Mod: S$PBB,,, | Performed by: NURSE PRACTITIONER

## 2017-08-17 PROCEDURE — 1159F MED LIST DOCD IN RCRD: CPT | Mod: ,,, | Performed by: NURSE PRACTITIONER

## 2017-08-17 PROCEDURE — 1157F ADVNC CARE PLAN IN RCRD: CPT | Mod: ,,, | Performed by: NURSE PRACTITIONER

## 2017-08-17 PROCEDURE — 3078F DIAST BP <80 MM HG: CPT | Mod: ,,, | Performed by: NURSE PRACTITIONER

## 2017-08-17 PROCEDURE — 3074F SYST BP LT 130 MM HG: CPT | Mod: ,,, | Performed by: NURSE PRACTITIONER

## 2017-08-17 NOTE — PROGRESS NOTES
Subjective:       Patient ID: William Mac is a 81 y.o. male.    Chief Complaint:  reconsulted for Uncontrolled DM.     HPI  Pt is a very pleasant 78 y/o wm with TYpe 2 DM since early 1990's, and chronic conditions pending review including  HLP, CAD. Does not have HTN--takes ACE for renal prophylaxis.  No c/o hypoglycemia symptoms.  He is only taking metformin.   BIggest issue is chronic back pain with prior stimulator placement.     Interim Events:    No acute events.  Back pain and now neck pain continues.  Rarely checks glucoses.  Memory loss is a concern but wife says its about the same as last visit.       On metformin 1000 bid,       Review of Systems   Constitutional: Negative for activity change, appetite change, fatigue and unexpected weight change.   HENT: Positive for hearing loss. Negative for trouble swallowing.    Eyes: Negative for photophobia and visual disturbance.   Respiratory: Negative for cough and shortness of breath.    Cardiovascular: Negative for chest pain, palpitations and leg swelling.   Gastrointestinal: Negative for constipation and diarrhea.   Genitourinary: Negative for difficulty urinating and frequency.   Musculoskeletal: Positive for back pain and neck stiffness. Negative for arthralgias and joint swelling.   Skin: Negative for rash and wound.   Neurological: Negative for dizziness, syncope, weakness and numbness.   Psychiatric/Behavioral: Negative for agitation. The patient is not nervous/anxious.        Objective:      Physical Exam   Constitutional: He is oriented to person, place, and time. He appears well-developed and well-nourished. No distress.   Appears approx age, well groomed, NAD   HENT:   Head: Normocephalic and atraumatic.   Nose: Nose normal.   Mouth/Throat: Oropharynx is clear and moist.   Eyes: Conjunctivae and EOM are normal. Pupils are equal, round, and reactive to light.   Neck: Normal range of motion. Neck supple. No tracheal deviation present. No thyromegaly  present.   Cardiovascular: Normal rate, regular rhythm, normal heart sounds and intact distal pulses.  Exam reveals no friction rub.    No murmur heard.  Pulmonary/Chest: Effort normal and breath sounds normal. No respiratory distress. He has no wheezes.   Musculoskeletal: Normal range of motion. He exhibits no edema.   Ambulates with cane.     L great toe showing propensity to ingrow and he has been chiseling out with possibly a pocketknife--counseled against.     Feet: no open wounds or calluses. Good pedal care     Protective Sensation (w/ 10 gram monofilament):  Right: Decreased  Left: Decreased    Visual Inspection:  Normal -  Bilateral    Pedal Pulses:   Right: Diminshed  Left: Diminshed    Posterior tibialis:   Right:Absent  Left: Absent     Lymphadenopathy:     He has no cervical adenopathy.   Neurological: He is alert and oriented to person, place, and time. He has normal reflexes. No cranial nerve deficit. He exhibits normal muscle tone. Coordination normal.   Vibratory sensation to feet intact bilaterally    Skin: Skin is warm and dry. No rash noted. He is not diaphoretic. No erythema.   Psychiatric: He has a normal mood and affect. His behavior is normal. Judgment and thought content normal.       Hemoglobin A1C   Date Value Ref Range Status   08/04/2017 7.1 (H) 4.0 - 5.6 % Final     Comment:     According to ADA guidelines, hemoglobin A1c <7.0% represents  optimal control in non-pregnant diabetic patients. Different  metrics may apply to specific patient populations.   Standards of Medical Care in Diabetes-2016.  For the purpose of screening for the presence of diabetes:  <5.7%     Consistent with the absence of diabetes  5.7-6.4%  Consistent with increasing risk for diabetes   (prediabetes)  >or=6.5%  Consistent with diabetes  Currently, no consensus exists for use of hemoglobin A1c  for diagnosis of diabetes for children.  This Hemoglobin A1c assay has significant interference with fetal   hemoglobin    (HbF). The results are invalid for patients with abnormal amounts of   HbF,   including those with known Hereditary Persistence   of Fetal Hemoglobin. Heterozygous hemoglobin variants (HbAS, HbAC,   HbAD, HbAE, HbA2) do not significantly interfere with this assay;   however, presence of multiple variants in a sample may impact the %   interference.     05/16/2017 7.4 (H) 4.5 - 6.2 % Final     Comment:     According to ADA guidelines, hemoglobin A1C <7.0% represents  optimal control in non-pregnant diabetic patients.  Different  metrics may apply to specific populations.   Standards of Medical Care in Diabetes - 2016.  For the purpose of screening for the presence of diabetes:  <5.7%     Consistent with the absence of diabetes  5.7-6.4%  Consistent with increasing risk for diabetes   (prediabetes)  >or=6.5%  Consistent with diabetes  Currently no consensus exists for use of hemoglobin A1C  for diagnosis of diabetes for children.     12/30/2016 7.4 (H) 4.5 - 6.2 % Final     Comment:     According to ADA guidelines, hemoglobin A1C <7.0% represents  optimal control in non-pregnant diabetic patients.  Different  metrics may apply to specific populations.   Standards of Medical Care in Diabetes - 2016.  For the purpose of screening for the presence of diabetes:  <5.7%     Consistent with the absence of diabetes  5.7-6.4%  Consistent with increasing risk for diabetes   (prediabetes)  >or=6.5%  Consistent with diabetes  Currently no consensus exists for use of hemoglobin A1C  for diagnosis of diabetes for children.         Chemistry        Component Value Date/Time     05/16/2017 0921    K 4.3 05/16/2017 0921     05/16/2017 0921    CO2 27 05/16/2017 0921    BUN 10 05/16/2017 0921    CREATININE 0.9 08/04/2017 0755     (H) 05/16/2017 0921        Component Value Date/Time    CALCIUM 9.6 05/16/2017 0921    ALKPHOS 53 (L) 05/16/2017 0921    AST 26 05/16/2017 0921    AST 29 01/14/2016 1135    ALT 22  05/16/2017 0921    BILITOT 0.7 05/16/2017 0921        Lab Results   Component Value Date    LDLCALC 68.0 05/16/2017     Lab Results   Component Value Date    TSH 1.578 05/16/2017       Assessment:     1. Type II diabetes mellitus with neurological manifestations  Chronic-stable-no change   2. Coronary artery disease, angina presence unspecified, unspecified vessel or lesion type, unspecified whether native or transplanted heart  -chronic-stable-avoid hypoglycemai    3. Hyperlipidemia, unspecified hyperlipidemia type  -chronic-stable-cont staing    4.       Peripheral neuropathy ----------chronic-stable-monitor foot care.   5.       Ingrown toenail--new diagnosis--high risk with his self debridement--advised against.      Plan:     continue metformin  1000 bid,  Cr 0.9.         ORDERS   8/17/17  A1C only rosalee day as his wife--Cailin Mac     6  mo with a1c prior    Cons podiatry --ingrown toenail.

## 2017-09-18 ENCOUNTER — TELEPHONE (OUTPATIENT)
Dept: NEUROLOGY | Facility: CLINIC | Age: 82
End: 2017-09-18

## 2017-09-18 ENCOUNTER — OFFICE VISIT (OUTPATIENT)
Dept: NEUROLOGY | Facility: CLINIC | Age: 82
End: 2017-09-18
Payer: MEDICARE

## 2017-09-18 VITALS
HEART RATE: 67 BPM | SYSTOLIC BLOOD PRESSURE: 120 MMHG | RESPIRATION RATE: 18 BRPM | BODY MASS INDEX: 27.43 KG/M2 | HEIGHT: 73 IN | WEIGHT: 207 LBS | DIASTOLIC BLOOD PRESSURE: 63 MMHG

## 2017-09-18 DIAGNOSIS — R20.2 NUMBNESS AND TINGLING IN BOTH HANDS: ICD-10-CM

## 2017-09-18 DIAGNOSIS — R26.0 ATAXIC GAIT: ICD-10-CM

## 2017-09-18 DIAGNOSIS — R20.0 NUMBNESS IN BOTH LEGS: Primary | ICD-10-CM

## 2017-09-18 DIAGNOSIS — R20.0 NUMBNESS AND TINGLING IN BOTH HANDS: ICD-10-CM

## 2017-09-18 PROCEDURE — 1159F MED LIST DOCD IN RCRD: CPT | Mod: ,,, | Performed by: PSYCHIATRY & NEUROLOGY

## 2017-09-18 PROCEDURE — 3078F DIAST BP <80 MM HG: CPT | Mod: ,,, | Performed by: PSYCHIATRY & NEUROLOGY

## 2017-09-18 PROCEDURE — 99204 OFFICE O/P NEW MOD 45 MIN: CPT | Mod: S$PBB,,, | Performed by: PSYCHIATRY & NEUROLOGY

## 2017-09-18 PROCEDURE — 1157F ADVNC CARE PLAN IN RCRD: CPT | Mod: ,,, | Performed by: PSYCHIATRY & NEUROLOGY

## 2017-09-18 PROCEDURE — 3074F SYST BP LT 130 MM HG: CPT | Mod: ,,, | Performed by: PSYCHIATRY & NEUROLOGY

## 2017-09-18 PROCEDURE — 99213 OFFICE O/P EST LOW 20 MIN: CPT | Mod: PBBFAC,PN | Performed by: PSYCHIATRY & NEUROLOGY

## 2017-09-18 PROCEDURE — 99999 PR PBB SHADOW E&M-EST. PATIENT-LVL III: CPT | Mod: PBBFAC,,, | Performed by: PSYCHIATRY & NEUROLOGY

## 2017-09-18 PROCEDURE — 1126F AMNT PAIN NOTED NONE PRSNT: CPT | Mod: ,,, | Performed by: PSYCHIATRY & NEUROLOGY
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
## 2017-09-18 NOTE — PATIENT INSTRUCTIONS
I suspect the main reason you have difficulty with walking and falls is because of severe neuropathy in your feet.     Plan:  -- get nerve conduction study to clarify what kind of neuropathy you have (to determine if blood tests are needed)  -- refer to physical therapy for gait training (to learn how to compensate for numb feet)  -- obtain rolling walker with chair for added stability while out of the home  -- return to clinic after nerve conduction study completed

## 2017-09-18 NOTE — LETTER
September 18, 2017      Quita Thompson MD  [address] [facility] [address] [place] [address]           [facility]  [address] [facility] [address] [place] [address]  Phone: [phone]  Fax: [phone]          Patient: William Mac   MR Number: [id number]   YOB: 1935   Date of Visit: 9/18/2017       Dear Dr. Quita Thompson:    Thank you for referring William Mac to me for evaluation. Attached you will find relevant portions of my assessment and plan of care.    If you have questions, please do not hesitate to call me. I look forward to following William Mac along with you.    Sincerely,    Yaneth Gomez MD    Enclosure  CC:  No Recipients    If you would like to receive this communication electronically, please contact [email or web address] or [phone] to request more information on [facility] Link access.    For providers and/or their staff who would like to refer a patient to [facility], please contact us through our one-stop-shop provider referral line, [facility], at [phone].    If you feel you have received this communication in error or would no longer like to receive these types of communications, please e-mail [email or web address]

## 2017-09-19 ENCOUNTER — TELEPHONE (OUTPATIENT)
Dept: NEUROLOGY | Facility: CLINIC | Age: 82
End: 2017-09-19

## 2017-09-27 ENCOUNTER — OFFICE VISIT (OUTPATIENT)
Dept: FAMILY MEDICINE | Facility: CLINIC | Age: 82
End: 2017-09-27
Payer: MEDICARE

## 2017-09-27 VITALS
WEIGHT: 205.94 LBS | BODY MASS INDEX: 27.29 KG/M2 | SYSTOLIC BLOOD PRESSURE: 126 MMHG | HEIGHT: 73 IN | HEART RATE: 62 BPM | DIASTOLIC BLOOD PRESSURE: 64 MMHG

## 2017-09-27 DIAGNOSIS — Z48.02 ENCOUNTER FOR REMOVAL OF SUTURES: Primary | ICD-10-CM

## 2017-09-27 DIAGNOSIS — S81.812S: ICD-10-CM

## 2017-09-27 DIAGNOSIS — L08.9 SKIN INFECTION: ICD-10-CM

## 2017-09-27 PROCEDURE — 99024 POSTOP FOLLOW-UP VISIT: CPT | Mod: ,,, | Performed by: NURSE PRACTITIONER

## 2017-09-27 PROCEDURE — 99213 OFFICE O/P EST LOW 20 MIN: CPT | Mod: S$PBB,,, | Performed by: NURSE PRACTITIONER

## 2017-09-27 PROCEDURE — 99999 PR PBB SHADOW E&M-EST. PATIENT-LVL III: CPT | Mod: PBBFAC,,, | Performed by: NURSE PRACTITIONER

## 2017-09-27 PROCEDURE — 1126F AMNT PAIN NOTED NONE PRSNT: CPT | Mod: ,,, | Performed by: NURSE PRACTITIONER

## 2017-09-27 PROCEDURE — 3074F SYST BP LT 130 MM HG: CPT | Mod: ,,, | Performed by: NURSE PRACTITIONER

## 2017-09-27 PROCEDURE — 99213 OFFICE O/P EST LOW 20 MIN: CPT | Mod: PBBFAC,PO,25 | Performed by: NURSE PRACTITIONER

## 2017-09-27 PROCEDURE — G0008 ADMIN INFLUENZA VIRUS VAC: HCPCS | Mod: PBBFAC,PO

## 2017-09-27 PROCEDURE — 1159F MED LIST DOCD IN RCRD: CPT | Mod: ,,, | Performed by: NURSE PRACTITIONER

## 2017-09-27 PROCEDURE — 1157F ADVNC CARE PLAN IN RCRD: CPT | Mod: ,,, | Performed by: NURSE PRACTITIONER

## 2017-09-27 PROCEDURE — 3078F DIAST BP <80 MM HG: CPT | Mod: ,,, | Performed by: NURSE PRACTITIONER

## 2017-09-27 RX ORDER — CEPHALEXIN 250 MG/1
500 CAPSULE ORAL EVERY 12 HOURS
Qty: 28 CAPSULE | Refills: 0 | Status: SHIPPED | OUTPATIENT
Start: 2017-09-27 | End: 2017-10-04

## 2017-09-27 NOTE — PROGRESS NOTES
Subjective:       Patient ID: William Mac is a 82 y.o. male.    Chief Complaint: Romove stitches right leg  Pt reports he went to [facility] ER on 9/13/2017.  He was out in the yard and something cut his lower left leg.  His wife states she couldn't stop the bleeding so they went to the Er. Per Er notes: they placed a single running suture to the laceration site. Pt reports he is here today to have the sutures removed.   Pt also requesting flu vaccine today.   HPI  Vitals:    09/27/17 0824   BP: 126/64   Pulse: 62     Review of Systems   Constitutional: Negative for chills and fever.   Skin: Positive for wound.        Healing laceration to anterior lower left leg with suture intact. Here for removal. Pt describes tenderness to touch and redness around site.  Pt states the redness has not traveled up his leg, just stayed around site with scab intact. No drainage, warmth, or swelling reported.   All other systems reviewed and are negative.      Objective:      Physical Exam   Constitutional: He is oriented to person, place, and time. He appears well-developed and well-nourished.   HENT:   Head: Normocephalic and atraumatic.   Eyes: Conjunctivae and EOM are normal.   Cardiovascular: Normal rate, regular rhythm and normal heart sounds.    Pulmonary/Chest: Effort normal and breath sounds normal.   Neurological: He is alert and oriented to person, place, and time.   Skin: Skin is warm and dry. Capillary refill takes less than 2 seconds.        Psychiatric: He has a normal mood and affect. His speech is normal and behavior is normal. Judgment and thought content normal. Cognition and memory are normal.         Photo above: site after suture removal  Assessment & Plan:     Suture Removal  Date/Time: 9/27/2017 8:36 AM  Performed by: ADAM HENDERSON  Authorized by: ADAM HENDERSON   Body area: lower extremity  Location details: left lower leg  Description of findings: see photo   Wound Appearance: no drainage, tender and  erythematous  Sutures Removed: 1  Post-removal: no dressing applied  Complications: No  Estimated blood loss (mL): 0  Specimens: No  Implants: No  Patient tolerance: Patient tolerated the procedure well with no immediate complications  Comments: Running suture, both knotted areas were cut and full line removed. No retained sutures noted at the end of procedure.        Instructed pt to keep area clean and not to remove scab.    Pt and wife concerned about minimal redness and tenderness to laceration site. Pt states, he was out in the yard when it occurred and he doesn't know what he cut himself on.   Take antibiotic as prescribed.  Call if symptoms worsen or persist. Instructed to return to clinic or call if site presents with any warmth to touch, erythema, swelling, or drainage.   RTC if symptoms worsen or persist and as needed.   Pt and wife verbalized understanding of care.

## 2017-11-01 RX ORDER — METFORMIN HYDROCHLORIDE 1000 MG/1
TABLET ORAL
Qty: 180 TABLET | Refills: 1 | OUTPATIENT
Start: 2017-11-01

## 2017-11-02 RX ORDER — METFORMIN HYDROCHLORIDE 1000 MG/1
TABLET ORAL
Qty: 180 TABLET | Refills: 2 | Status: SHIPPED | OUTPATIENT
Start: 2017-11-02 | End: 2018-09-10 | Stop reason: SDUPTHER

## 2017-11-02 NOTE — TELEPHONE ENCOUNTER
----- Message from Vanessa Espinoza sent at 11/2/2017 11:41 AM CDT -----  metformin (GLUCOPHAGE) 1000 MG tablet     [phone] (home)     [id number] - [place], [place] - [address]  [address]  [facility] [address]  Phone: [phone] Fax: [phone]

## 2017-11-15 RX ORDER — LORATADINE 10 MG/1
TABLET ORAL
Qty: 90 TABLET | Refills: 0 | Status: SHIPPED | OUTPATIENT
Start: 2017-11-15 | End: 2018-03-05 | Stop reason: SDUPTHER

## 2017-11-29 ENCOUNTER — LAB VISIT (OUTPATIENT)
Dept: LAB | Facility: HOSPITAL | Age: 82
End: 2017-11-29
Attending: NURSE PRACTITIONER
Payer: MEDICARE

## 2017-11-29 DIAGNOSIS — E11.49 TYPE II DIABETES MELLITUS WITH NEUROLOGICAL MANIFESTATIONS: ICD-10-CM

## 2017-11-29 LAB
ESTIMATED AVG GLUCOSE: 143 MG/DL
HBA1C MFR BLD HPLC: 6.6 %

## 2017-11-29 PROCEDURE — 36415 COLL VENOUS BLD VENIPUNCTURE: CPT | Mod: PO

## 2017-11-29 PROCEDURE — 83036 HEMOGLOBIN GLYCOSYLATED A1C: CPT

## 2017-12-08 RX ORDER — SERTRALINE HYDROCHLORIDE 50 MG/1
TABLET, FILM COATED ORAL
Qty: 90 TABLET | Refills: 0 | Status: SHIPPED | OUTPATIENT
Start: 2017-12-08 | End: 2017-12-27 | Stop reason: SDUPTHER

## 2017-12-12 ENCOUNTER — PATIENT OUTREACH (OUTPATIENT)
Dept: ADMINISTRATIVE | Facility: HOSPITAL | Age: 82
End: 2017-12-12

## 2017-12-12 NOTE — LETTER
December 12, 2017    William Mac  [address] [address] [address]             [facility]  [address]  [facility] [address]  Phone: [phone] Dear Mr. Mac:    [facility] is committed to your overall health.  To help you get the most out of each of your visits, we will review your information to make sure you are up to date on all of your recommended tests and/or procedures.      Dr. Thompson has found that your chart shows you may be due for annual diabetic foot and eye exam, urine protein check.    If you have had any of the above done at another facility, please bring the records or information with you so that your record at [facility] will be complete.  If you would like to schedule any of these, please contact me.    If you are currently taking medication, please bring it with you to your appointment for review.    Also, if you have any type of Advanced Directives, please bring them with you to your office visit so we may scan them into your chart.          If you have any questions or concerns, please don't hesitate to call.    Sincerely,        Itzel Morales LPN Clinical Care Coordinator  [facility] [address] [place] [address]  [phone] (p)   [phone] (f)

## 2017-12-26 ENCOUNTER — TELEPHONE (OUTPATIENT)
Dept: INTERNAL MEDICINE | Facility: CLINIC | Age: 82
End: 2017-12-26

## 2017-12-26 DIAGNOSIS — E11.69 CONTROLLED TYPE 2 DIABETES MELLITUS WITH OTHER SPECIFIED COMPLICATION, WITHOUT LONG-TERM CURRENT USE OF INSULIN: Primary | ICD-10-CM

## 2017-12-27 ENCOUNTER — OFFICE VISIT (OUTPATIENT)
Dept: INTERNAL MEDICINE | Facility: CLINIC | Age: 82
End: 2017-12-27
Payer: MEDICARE

## 2017-12-27 ENCOUNTER — TELEPHONE (OUTPATIENT)
Dept: INTERNAL MEDICINE | Facility: CLINIC | Age: 82
End: 2017-12-27

## 2017-12-27 VITALS
WEIGHT: 199.94 LBS | DIASTOLIC BLOOD PRESSURE: 68 MMHG | HEIGHT: 73 IN | HEART RATE: 72 BPM | BODY MASS INDEX: 26.5 KG/M2 | SYSTOLIC BLOOD PRESSURE: 106 MMHG

## 2017-12-27 DIAGNOSIS — J34.89 RHINORRHEA: ICD-10-CM

## 2017-12-27 DIAGNOSIS — F32.A DEPRESSION, UNSPECIFIED DEPRESSION TYPE: ICD-10-CM

## 2017-12-27 DIAGNOSIS — E11.69 TYPE 2 DIABETES MELLITUS WITH HYPERLIPIDEMIA: ICD-10-CM

## 2017-12-27 DIAGNOSIS — E11.42 DIABETIC POLYNEUROPATHY ASSOCIATED WITH TYPE 2 DIABETES MELLITUS: ICD-10-CM

## 2017-12-27 DIAGNOSIS — E11.69 CONTROLLED TYPE 2 DIABETES MELLITUS WITH OTHER SPECIFIED COMPLICATION, WITHOUT LONG-TERM CURRENT USE OF INSULIN: Primary | ICD-10-CM

## 2017-12-27 DIAGNOSIS — E78.5 TYPE 2 DIABETES MELLITUS WITH HYPERLIPIDEMIA: ICD-10-CM

## 2017-12-27 PROCEDURE — 82570 ASSAY OF URINE CREATININE: CPT

## 2017-12-27 PROCEDURE — 99214 OFFICE O/P EST MOD 30 MIN: CPT | Mod: S$PBB,,, | Performed by: INTERNAL MEDICINE

## 2017-12-27 PROCEDURE — 99214 OFFICE O/P EST MOD 30 MIN: CPT | Mod: PBBFAC,PO | Performed by: INTERNAL MEDICINE

## 2017-12-27 PROCEDURE — 99999 PR PBB SHADOW E&M-EST. PATIENT-LVL IV: CPT | Mod: PBBFAC,,, | Performed by: INTERNAL MEDICINE

## 2017-12-27 RX ORDER — AZELASTINE 1 MG/ML
1 SPRAY, METERED NASAL 2 TIMES DAILY
Qty: 30 ML | Refills: 11 | Status: SHIPPED | OUTPATIENT
Start: 2017-12-27 | End: 2018-11-14

## 2017-12-27 RX ORDER — PRAVASTATIN SODIUM 40 MG/1
40 TABLET ORAL NIGHTLY
Qty: 90 TABLET | Refills: 1 | Status: SHIPPED | OUTPATIENT
Start: 2017-12-27 | End: 2018-08-02 | Stop reason: SDUPTHER

## 2017-12-27 RX ORDER — SERTRALINE HYDROCHLORIDE 50 MG/1
50 TABLET, FILM COATED ORAL DAILY
Qty: 90 TABLET | Refills: 0 | Status: SHIPPED | OUTPATIENT
Start: 2017-12-27 | End: 2018-08-02 | Stop reason: SDUPTHER
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
## 2017-12-27 NOTE — TELEPHONE ENCOUNTER
This pt. Needs to have diabetic retina camera.  The appt. Is too late in the day, please see if they can come in earlier in the day and get retina exam.  Will put in order.

## 2017-12-28 LAB
CREAT UR-MCNC: 110 MG/DL
MICROALBUMIN UR DL<=1MG/L-MCNC: 13 UG/ML
MICROALBUMIN/CREATININE RATIO: 11.8 UG/MG

## 2017-12-29 PROBLEM — E11.42 DIABETIC POLYNEUROPATHY ASSOCIATED WITH TYPE 2 DIABETES MELLITUS: Status: ACTIVE | Noted: 2017-12-29

## 2018-01-08 ENCOUNTER — TELEPHONE (OUTPATIENT)
Dept: ENDOCRINOLOGY | Facility: CLINIC | Age: 83
End: 2018-01-08

## 2018-01-08 NOTE — TELEPHONE ENCOUNTER
----- Message from Maritza Edwards sent at 1/8/2018  9:30 AM CST -----  Contact: Patient's wife, Cailin Evans  Patient's wife is calling regarding patient's diabetic supplies; the diabetic supply [employer] sent over a request in November and then again this morning from [facility].  The request is for needles and test strips, etc.  Patient's wife would like someone to call her back to see why this has not been sent back yet.  Call Back#[phone]  Thanks

## 2018-01-08 NOTE — TELEPHONE ENCOUNTER
Advised no [facility] orders were received in Nov, received them today and they have been competed and sent back

## 2018-01-16 ENCOUNTER — OFFICE VISIT (OUTPATIENT)
Dept: PODIATRY | Facility: CLINIC | Age: 83
End: 2018-01-16
Payer: MEDICARE

## 2018-01-16 VITALS — HEIGHT: 73 IN | WEIGHT: 199 LBS | BODY MASS INDEX: 26.37 KG/M2

## 2018-01-16 DIAGNOSIS — E11.42 DIABETIC POLYNEUROPATHY ASSOCIATED WITH TYPE 2 DIABETES MELLITUS: Primary | ICD-10-CM

## 2018-01-16 PROCEDURE — 99212 OFFICE O/P EST SF 10 MIN: CPT | Mod: PBBFAC,PN | Performed by: PODIATRIST

## 2018-01-16 PROCEDURE — 99999 PR PBB SHADOW E&M-EST. PATIENT-LVL II: CPT | Mod: PBBFAC,,, | Performed by: PODIATRIST

## 2018-01-16 PROCEDURE — 99202 OFFICE O/P NEW SF 15 MIN: CPT | Mod: S$PBB,,, | Performed by: PODIATRIST

## 2018-01-16 NOTE — LETTER
January 16, 2018      Quita Thompson MD  [address] [facility] [address] [place] [address]           [facility]  [address] [facility] [address] [place] [address]  Phone: [phone]          Patient: William Mac   MR Number: [id number]   YOB: 1935   Date of Visit: 1/16/2018       Dear Dr. Quita Thompson:    Thank you for referring William Mac to me for evaluation. Attached you will find relevant portions of my assessment and plan of care.    If you have questions, please do not hesitate to call me. I look forward to following William Mac along with you.    Sincerely,    Anuel Ramirez, RICARDO    Enclosure  CC:  No Recipients    If you would like to receive this communication electronically, please contact [email or web address] or [phone] to request more information on [facility] access.    For providers and/or their staff who would like to refer a patient to [facility], please contact us through our one-stop-shop provider referral line, [facility], at [phone].    If you feel you have received this communication in error or would no longer like to receive these types of communications, please e-mail [email or web address]

## 2018-01-16 NOTE — PROGRESS NOTES
Subjective:      Patient ID: William Mac is a 82 y.o. male.    Chief Complaint: Diabetic Foot Exam (Dr Quita Thompson 12/2017)    William is a 82 y.o. male who presents to the clinic upon referral from Dr. Thompson  for evaluation and treatment of diabetic feet. William has a past medical history of Arthritis; Back pain; CABG (coronary artery bypass graft) (1985 and 2003); CAD (coronary artery disease); Cervical vertebral fracture (past Hx); Colon polyps; Depression; Diabetes mellitus; Hypertension; Hypertriglyceridemia; Neuropathy; PVD (peripheral vascular disease); Vascular disease, peripheral; and Vitamin B12 deficiency. Patient relates no major problem with feet. Only complaints today consist of Diabetes, increased risk amputation needing evaluation/management/optomization of foot care.  No current symptoms.    PCP: Quita Thompson MD    Date Last Seen by PCP:   Chief Complaint   Patient presents with    Diabetic Foot Exam     Dr Quita Thompson 12/2017         Current shoe gear: Casual shoes    Hemoglobin A1C   Date Value Ref Range Status   11/29/2017 6.6 (H) 4.0 - 5.6 % Final     Comment:     According to ADA guidelines, hemoglobin A1c <7.0% represents  optimal control in non-pregnant diabetic patients. Different  metrics may apply to specific patient populations.   Standards of Medical Care in Diabetes-2016.  For the purpose of screening for the presence of diabetes:  <5.7%     Consistent with the absence of diabetes  5.7-6.4%  Consistent with increasing risk for diabetes   (prediabetes)  >or=6.5%  Consistent with diabetes  Currently, no consensus exists for use of hemoglobin A1c  for diagnosis of diabetes for children.  This Hemoglobin A1c assay has significant interference with fetal   hemoglobin   (HbF). The results are invalid for patients with abnormal amounts of   HbF,   including those with known Hereditary Persistence   of Fetal Hemoglobin. Heterozygous hemoglobin variants (HbAS, HbAC,   HbAD, HbAE,  HbA2) do not significantly interfere with this assay;   however, presence of multiple variants in a sample may impact the %   interference.     08/04/2017 7.1 (H) 4.0 - 5.6 % Final     Comment:     According to ADA guidelines, hemoglobin A1c <7.0% represents  optimal control in non-pregnant diabetic patients. Different  metrics may apply to specific patient populations.   Standards of Medical Care in Diabetes-2016.  For the purpose of screening for the presence of diabetes:  <5.7%     Consistent with the absence of diabetes  5.7-6.4%  Consistent with increasing risk for diabetes   (prediabetes)  >or=6.5%  Consistent with diabetes  Currently, no consensus exists for use of hemoglobin A1c  for diagnosis of diabetes for children.  This Hemoglobin A1c assay has significant interference with fetal   hemoglobin   (HbF). The results are invalid for patients with abnormal amounts of   HbF,   including those with known Hereditary Persistence   of Fetal Hemoglobin. Heterozygous hemoglobin variants (HbAS, HbAC,   HbAD, HbAE, HbA2) do not significantly interfere with this assay;   however, presence of multiple variants in a sample may impact the %   interference.     05/16/2017 7.4 (H) 4.5 - 6.2 % Final     Comment:     According to ADA guidelines, hemoglobin A1C <7.0% represents  optimal control in non-pregnant diabetic patients.  Different  metrics may apply to specific populations.   Standards of Medical Care in Diabetes - 2016.  For the purpose of screening for the presence of diabetes:  <5.7%     Consistent with the absence of diabetes  5.7-6.4%  Consistent with increasing risk for diabetes   (prediabetes)  >or=6.5%  Consistent with diabetes  Currently no consensus exists for use of hemoglobin A1C  for diagnosis of diabetes for children.             Review of Systems   Constitution: Negative for chills, diaphoresis, fever, malaise/fatigue and night sweats.   Cardiovascular: Negative for claudication, cyanosis, leg swelling  and syncope.   Skin: Negative for color change, dry skin, nail changes, rash, suspicious lesions and unusual hair distribution.   Musculoskeletal: Negative for falls, joint pain, joint swelling, muscle cramps, muscle weakness and stiffness.   Gastrointestinal: Negative for constipation, diarrhea, nausea and vomiting.   Neurological: Positive for numbness and sensory change. Negative for brief paralysis, disturbances in coordination, focal weakness, paresthesias and tremors.           Objective:      Physical Exam   Constitutional: He is oriented to person, place, and time. He appears well-developed and well-nourished. He is cooperative. No distress.   Oriented to time, place, and person.   Cardiovascular:   Pulses:       Popliteal pulses are 2+ on the right side, and 2+ on the left side.        Dorsalis pedis pulses are 1+ on the right side, and 1+ on the left side.        Posterior tibial pulses are 1+ on the right side, and 1+ on the left side.   Capillary refill 4 seconds all toes/distal feet, all toes/both feet warm to touch.      Negative lymphadenopathy bilateral popliteal fossa and tarsal tunnel.      Negavie lower extremity edema bilateral.     Musculoskeletal:        Right ankle: He exhibits normal range of motion, no swelling, no ecchymosis, no deformity, no laceration and normal pulse. Achilles tendon normal. Achilles tendon exhibits no pain, no defect and normal Vanegas's test results.   Normal angle, base, station of gait. Decreased stride length, early heel off, moderately propulsive toe off bilateral.    All ten toes without clubbing, cyanosis, or signs of ischemia.      No pain to palpation bilateral lower extremities.      Range of motion, stability, muscle strength, and muscle tone are age and health appropriate normal bilateral feet and legs.       Lymphadenopathy: No inguinal adenopathy noted on the right or left side.   Negative lymphadenopathy bilateral popliteal fossa and tarsal  tunnel.    Negative lymphangitic streaking bilateral feet/ankles/legs.   Neurological: He is alert and oriented to person, place, and time. He has normal strength. He is not disoriented. He displays no atrophy and no tremor. A sensory deficit is present. He exhibits normal muscle tone. Gait normal.   Reflex Scores:       Patellar reflexes are 2+ on the right side and 2+ on the left side.       Achilles reflexes are 2+ on the right side and 2+ on the left side.  Decreased/absent vibratory sensation bilateral feet to 128Hz tuning fork.    Diminished/loss of protective sensation all toes bilateral to 10 gram monofilament.    Burning pain toes nightly.   Skin: Skin is warm, dry and intact. Capillary refill takes 2 to 3 seconds. No abrasion, no bruising, no burn, no ecchymosis, no laceration, no lesion, no petechiae and no rash noted. He is not diaphoretic. No cyanosis or erythema. No pallor. Nails show no clubbing.     Skin thin, shiny, atrophic, with decreased density and distribution of pedal hair bilateral, but without hyperpigmentation, ponce discoloration,  ulcers, masses, nodules or cords palpated bilateral feet and legs.    All toenails normal color and trophic qualities. Except 5th left which has thickening  without ulceration, drainage, pus, tracking, fluctuance, malodor, or cardinal signs infection.      Psychiatric: He has a normal mood and affect.             Assessment:       Encounter Diagnosis   Name Primary?    Diabetic polyneuropathy associated with type 2 diabetes mellitus Yes         Plan:       William was seen today for diabetic foot exam.    Diagnoses and all orders for this visit:    Diabetic polyneuropathy associated with type 2 diabetes mellitus      I counseled the patient on his conditions, their implications and medical management.        - Shoe inspection. Diabetic Foot Education. Patient reminded of the importance of good nutrition and blood sugar control to help prevent podiatric  complications of diabetes. Patient instructed on proper foot hygeine. We discussed wearing proper shoe gear, daily foot inspections, never walking without protective shoe gear, never putting sharp instruments to feet, routine podiatric visits at least annually.      Discussed conservative treatment with shoes of adequate dimensions, material, and style to alleviate symptoms and delay or prevent surgical intervention.         Follow-up in about 1 year (around 1/16/2019) for AR exam.

## 2018-01-19 ENCOUNTER — TELEPHONE (OUTPATIENT)
Dept: FAMILY MEDICINE | Facility: CLINIC | Age: 83
End: 2018-01-19

## 2018-01-19 NOTE — TELEPHONE ENCOUNTER
----- Message from Demetrius Espinoza sent at 1/19/2018  1:10 PM CST -----  Contact: Wife/Fariha Fried called in regarding the attached patient () and wanted to see if Dr. Thompson could send a Rx over for a lift chair?  Fariha would like a call back at [phone]

## 2018-01-23 ENCOUNTER — TELEPHONE (OUTPATIENT)
Dept: FAMILY MEDICINE | Facility: CLINIC | Age: 83
End: 2018-01-23

## 2018-01-23 NOTE — TELEPHONE ENCOUNTER
See message below. Please advise. Thank you.  Spoke to pt wife and she states that pt is having a hard time getting up and it is getting worse. States she now has to get help from others to get him up. States a lift chair will help very much. Please advise. Thanks.

## 2018-01-23 NOTE — TELEPHONE ENCOUNTER
----- Message from Merle Butt sent at 1/23/2018  1:39 PM CST -----  Call pt at [phone]  Re/ call for nurse

## 2018-01-23 NOTE — TELEPHONE ENCOUNTER
----- Message from Nehemias Bauman sent at 1/23/2018  8:17 AM CST -----  Contact: Wife-Cailin - [phone]  Patient's wife asking to speak with  the nurse regarding an order for a lift chair for the patient. Thanks!

## 2018-01-24 ENCOUNTER — TELEPHONE (OUTPATIENT)
Dept: FAMILY MEDICINE | Facility: CLINIC | Age: 83
End: 2018-01-24

## 2018-01-24 NOTE — TELEPHONE ENCOUNTER
Pt's spouse, Regine phoned in regards to pt having a harder and harder time to get up out of the chair. Pt was seen by Dr Gomez, neurologist and was sent to have 3 h our tests done in regards to t his problem. Regine states she was told he would benefit from a lift chair but the RX would need to come from his PCP. Please review and advise on RX for a lift chair for pt. Regine requested to be called back in regards to answer and if no answer on phone leave a message. Thank you. CLC

## 2018-01-24 NOTE — TELEPHONE ENCOUNTER
----- Message from Radha Marques sent at 1/24/2018  9:58 AM CST -----  Contact: [phone] Regine Mac (Spouse)  [phone] Regine Mac (Spouse) returning phone call

## 2018-02-05 ENCOUNTER — TELEPHONE (OUTPATIENT)
Dept: NEUROLOGY | Facility: CLINIC | Age: 83
End: 2018-02-05

## 2018-02-05 NOTE — TELEPHONE ENCOUNTER
Called and spoke with patient's wife. Informed her of Dr. delgado's note below. Patient's wife verbalized understanding and said the patient will get his lab work done Thursday.

## 2018-02-05 NOTE — TELEPHONE ENCOUNTER
----- Message from Yaneth Gomez MD sent at 2/5/2018 12:42 PM CST -----  Please call patient to let him know I reviewed his nerve conduction study, which shows the neuropathy may be due to more than diabetes and I would like him to get additional blood work at his convenience at [facility]. Does not need to be fasting.

## 2018-02-08 ENCOUNTER — LAB VISIT (OUTPATIENT)
Dept: LAB | Facility: HOSPITAL | Age: 83
End: 2018-02-08
Attending: PSYCHIATRY & NEUROLOGY
Payer: MEDICARE

## 2018-02-08 DIAGNOSIS — E53.8 VITAMIN B12 DEFICIENCY: ICD-10-CM

## 2018-02-08 DIAGNOSIS — G62.9 NEUROPATHY: ICD-10-CM

## 2018-02-08 PROCEDURE — 86334 IMMUNOFIX E-PHORESIS SERUM: CPT | Mod: 26,,, | Performed by: PATHOLOGY

## 2018-02-08 PROCEDURE — 83921 ORGANIC ACID SINGLE QUANT: CPT

## 2018-02-08 PROCEDURE — 84165 PROTEIN E-PHORESIS SERUM: CPT | Mod: 26,,, | Performed by: PATHOLOGY

## 2018-02-08 PROCEDURE — 84165 PROTEIN E-PHORESIS SERUM: CPT

## 2018-02-08 PROCEDURE — 86334 IMMUNOFIX E-PHORESIS SERUM: CPT

## 2018-02-09 LAB
ALBUMIN SERPL ELPH-MCNC: 4.45 G/DL
ALPHA1 GLOB SERPL ELPH-MCNC: 0.3 G/DL
ALPHA2 GLOB SERPL ELPH-MCNC: 0.96 G/DL
B-GLOBULIN SERPL ELPH-MCNC: 0.78 G/DL
GAMMA GLOB SERPL ELPH-MCNC: 0.8 G/DL
INTERPRETATION SERPL IFE-IMP: NORMAL
PATHOLOGIST INTERPRETATION IFE: NORMAL
PATHOLOGIST INTERPRETATION SPE: NORMAL
PROT SERPL-MCNC: 7.3 G/DL
VIT B12 SERPL-MCNC: 214 NG/L

## 2018-02-12 LAB — METHYLMALONATE SERPL-SCNC: 0.16 NMOL/ML

## 2018-02-16 ENCOUNTER — TELEPHONE (OUTPATIENT)
Dept: NEUROLOGY | Facility: CLINIC | Age: 83
End: 2018-02-16

## 2018-02-16 NOTE — TELEPHONE ENCOUNTER
----- Message from Yaneth Gomez MD sent at 2/16/2018  1:16 PM CST -----  Patients neuropathy labwork showed low normal B12 level, and normal SPEP / LUÍS. The B12 deficiency is likely contributing to his neuropathy in his feet.     Please let patient know to begin on oral vitamin B12 1000mcg over the counter - 1 tablet daily for this.

## 2018-02-16 NOTE — TELEPHONE ENCOUNTER
Called patient to inform them of their lab work results. Left voicemail with details, instructed to call if any more information was needed.

## 2018-03-05 RX ORDER — LORATADINE 10 MG/1
10 TABLET ORAL DAILY
Qty: 90 TABLET | Refills: 0 | Status: SHIPPED | OUTPATIENT
Start: 2018-03-05 | End: 2018-07-27 | Stop reason: SDUPTHER

## 2018-06-21 DIAGNOSIS — E11.9 TYPE 2 DIABETES MELLITUS WITHOUT COMPLICATION, WITHOUT LONG-TERM CURRENT USE OF INSULIN: Primary | ICD-10-CM

## 2018-07-27 RX ORDER — LORATADINE 10 MG/1
10 TABLET ORAL DAILY
Qty: 90 TABLET | Refills: 0 | Status: SHIPPED | OUTPATIENT
Start: 2018-07-27 | End: 2018-11-16 | Stop reason: SDUPTHER

## 2018-07-27 NOTE — PROGRESS NOTES
Refill Authorization Note     is requesting a refill authorization.    Brief assessment and rationale for refill: APPROVE; prr  Amount/Quantity of medication ordered: 90d  Date of last appointment: 12/27/2017     Refills Authorized: Yes  If authorized number of refills: 3           Medication Therapy Plan: Hx of Allergic Rhinitis/Rhinorrhea; Nov: 8/1/18 with you to est; Approve 12 more months   Name and strength of medication: loratadine (CLARITIN) 10 mg tablet  How patient will take medication: t1t po qd  Medication reconciliation completed: No  Comments:

## 2018-08-01 ENCOUNTER — OFFICE VISIT (OUTPATIENT)
Dept: FAMILY MEDICINE | Facility: CLINIC | Age: 83
End: 2018-08-01
Payer: MEDICARE

## 2018-08-01 VITALS
SYSTOLIC BLOOD PRESSURE: 90 MMHG | WEIGHT: 198.63 LBS | DIASTOLIC BLOOD PRESSURE: 56 MMHG | HEIGHT: 73 IN | BODY MASS INDEX: 26.33 KG/M2 | HEART RATE: 76 BPM

## 2018-08-01 DIAGNOSIS — E78.5 HYPERLIPIDEMIA, UNSPECIFIED HYPERLIPIDEMIA TYPE: ICD-10-CM

## 2018-08-01 DIAGNOSIS — I10 ESSENTIAL HYPERTENSION: ICD-10-CM

## 2018-08-01 DIAGNOSIS — I73.9 PVD (PERIPHERAL VASCULAR DISEASE): ICD-10-CM

## 2018-08-01 DIAGNOSIS — D69.6 THROMBOCYTOPENIA: ICD-10-CM

## 2018-08-01 DIAGNOSIS — E11.49 TYPE II DIABETES MELLITUS WITH NEUROLOGICAL MANIFESTATIONS: Primary | ICD-10-CM

## 2018-08-01 PROBLEM — E11.69 HYPERLIPIDEMIA DUE TO TYPE 2 DIABETES MELLITUS: Status: RESOLVED | Noted: 2017-05-14 | Resolved: 2018-08-01

## 2018-08-01 PROBLEM — R29.898 WEAKNESS OF LEFT HIP: Status: RESOLVED | Noted: 2017-08-12 | Resolved: 2018-08-01

## 2018-08-01 PROCEDURE — 99214 OFFICE O/P EST MOD 30 MIN: CPT | Mod: S$PBB,,, | Performed by: FAMILY MEDICINE

## 2018-08-01 PROCEDURE — 99213 OFFICE O/P EST LOW 20 MIN: CPT | Mod: PBBFAC,PO | Performed by: FAMILY MEDICINE

## 2018-08-01 PROCEDURE — 99999 PR PBB SHADOW E&M-EST. PATIENT-LVL III: CPT | Mod: PBBFAC,,, | Performed by: FAMILY MEDICINE

## 2018-08-01 NOTE — PROGRESS NOTES
Subjective:       Patient ID: William Mac is a 82 y.o. male.    Chief Complaint: Establish Care    Here as new patient; previously saw Dr. Thompson.  Doing well overall with no major complaints.      Hypertension   This is a chronic problem. The current episode started more than 1 year ago. The problem is controlled. Pertinent negatives include no chest pain, palpitations or shortness of breath.   Diabetes   He presents for his follow-up diabetic visit. He has type 2 diabetes mellitus. His disease course has been stable. Pertinent negatives for hypoglycemia include no nervousness/anxiousness. Pertinent negatives for diabetes include no chest pain and no fatigue.     Review of Systems   Constitutional: Negative for chills, fatigue and fever.   Respiratory: Negative for cough, chest tightness and shortness of breath.    Cardiovascular: Negative for chest pain, palpitations and leg swelling.   Endocrine: Negative for cold intolerance and heat intolerance.   Skin: Negative for rash.   Psychiatric/Behavioral: Negative for dysphoric mood. The patient is not nervous/anxious.        Objective:      Physical Exam   Constitutional: He appears well-developed and well-nourished.   Cardiovascular: Normal rate, regular rhythm and normal heart sounds.    Pulmonary/Chest: Effort normal and breath sounds normal.   Psychiatric: He has a normal mood and affect.   Nursing note and vitals reviewed.      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 Hyperlipidemia, unspecified hyperlipidemia type    3. Essential hypertension    4. Thrombocytopenia    5. PVD (peripheral vascular disease)        Plan:       Type II diabetes mellitus with neurological manifestations    Hyperlipidemia, unspecified hyperlipidemia type    Essential hypertension    Thrombocytopenia    PVD (peripheral vascular disease)        Labs next month with endo.  Will monitor chronic medical issues and continue current plan of  care.    Follow-up in about 6 months (around 2/1/2019), or if symptoms worsen or fail to improve.

## 2018-08-02 DIAGNOSIS — E78.5 TYPE 2 DIABETES MELLITUS WITH HYPERLIPIDEMIA: ICD-10-CM

## 2018-08-02 DIAGNOSIS — F32.A DEPRESSION, UNSPECIFIED DEPRESSION TYPE: ICD-10-CM

## 2018-08-02 DIAGNOSIS — E11.69 TYPE 2 DIABETES MELLITUS WITH HYPERLIPIDEMIA: ICD-10-CM

## 2018-08-02 NOTE — PROGRESS NOTES
Refill Authorization Note     is requesting a refill authorization.    Brief assessment and rationale for refill: APPROVE; prr  Amount/Quantity of medication ordered: 90d  Date of last appointment: 8/1/2018     Refills Authorized: Yes  If authorized number of refills: 1           Medication Therapy Plan: HLD- commented at lov; Depression- commented on 12/21/17; Has upcoming labs scheduled for 9/20/18;  Approve 6 more month of zoloft and 3 more months of pravachol  Name and strength of medication: pravastatin (PRAVACHOL) 40 MG tablet/ sertraline (ZOLOFT) 50 MG tablet  How patient will take medication: UTD  Medication reconciliation completed: No  Comments:     Lab Results   Component Value Date    CHOL 136 05/16/2017    CHOL 150 08/07/2016    CHOL 132 08/07/2015     Lab Results   Component Value Date    HDL 28 (L) 05/16/2017    HDL 25 (L) 08/07/2016    HDL 28 (L) 08/07/2015     Lab Results   Component Value Date    LDLCALC 68.0 05/16/2017    LDLCALC Invalid, Trig>400.0 08/07/2016    LDLCALC 63.6 08/07/2015     Lab Results   Component Value Date    TRIG 200 (H) 05/16/2017    TRIG 541 (H) 08/07/2016    TRIG 202 (H) 08/07/2015     Lab Results   Component Value Date    CHOLHDL 20.6 05/16/2017    CHOLHDL 16.7 (L) 08/07/2016    CHOLHDL 21.2 08/07/2015

## 2018-08-03 RX ORDER — SERTRALINE HYDROCHLORIDE 50 MG/1
50 TABLET, FILM COATED ORAL DAILY
Qty: 90 TABLET | Refills: 1 | Status: SHIPPED | OUTPATIENT
Start: 2018-08-03 | End: 2018-11-16 | Stop reason: SDUPTHER

## 2018-08-03 RX ORDER — PRAVASTATIN SODIUM 40 MG/1
40 TABLET ORAL NIGHTLY
Qty: 90 TABLET | Refills: 0 | Status: SHIPPED | OUTPATIENT
Start: 2018-08-03 | End: 2018-11-16 | Stop reason: SDUPTHER

## 2018-09-10 RX ORDER — METFORMIN HYDROCHLORIDE 1000 MG/1
1000 TABLET ORAL 2 TIMES DAILY
Qty: 180 TABLET | Refills: 0 | Status: SHIPPED | OUTPATIENT
Start: 2018-09-10 | End: 2019-01-09 | Stop reason: SDUPTHER

## 2018-09-21 ENCOUNTER — LAB VISIT (OUTPATIENT)
Dept: LAB | Facility: HOSPITAL | Age: 83
End: 2018-09-21
Attending: NURSE PRACTITIONER
Payer: MEDICARE

## 2018-09-21 DIAGNOSIS — E11.9 TYPE 2 DIABETES MELLITUS WITHOUT COMPLICATION, WITHOUT LONG-TERM CURRENT USE OF INSULIN: ICD-10-CM

## 2018-09-21 LAB
ALBUMIN/CREAT UR: 10.2 UG/MG
CREAT UR-MCNC: 206 MG/DL
MICROALBUMIN UR DL<=1MG/L-MCNC: 21 UG/ML

## 2018-09-21 PROCEDURE — 82043 UR ALBUMIN QUANTITATIVE: CPT

## 2018-09-27 ENCOUNTER — OFFICE VISIT (OUTPATIENT)
Dept: ENDOCRINOLOGY | Facility: CLINIC | Age: 83
End: 2018-09-27
Payer: MEDICARE

## 2018-09-27 ENCOUNTER — IMMUNIZATION (OUTPATIENT)
Dept: FAMILY MEDICINE | Facility: CLINIC | Age: 83
End: 2018-09-27
Payer: MEDICARE

## 2018-09-27 VITALS
HEIGHT: 73 IN | HEART RATE: 72 BPM | SYSTOLIC BLOOD PRESSURE: 122 MMHG | BODY MASS INDEX: 25.88 KG/M2 | DIASTOLIC BLOOD PRESSURE: 66 MMHG | WEIGHT: 195.31 LBS

## 2018-09-27 DIAGNOSIS — I25.10 CORONARY ARTERY DISEASE, ANGINA PRESENCE UNSPECIFIED, UNSPECIFIED VESSEL OR LESION TYPE, UNSPECIFIED WHETHER NATIVE OR TRANSPLANTED HEART: ICD-10-CM

## 2018-09-27 DIAGNOSIS — E11.49 TYPE II DIABETES MELLITUS WITH NEUROLOGICAL MANIFESTATIONS: Primary | ICD-10-CM

## 2018-09-27 DIAGNOSIS — I10 ESSENTIAL HYPERTENSION: ICD-10-CM

## 2018-09-27 PROCEDURE — 90662 IIV NO PRSV INCREASED AG IM: CPT | Mod: PBBFAC,PO

## 2018-09-27 PROCEDURE — 99213 OFFICE O/P EST LOW 20 MIN: CPT | Mod: S$PBB,,, | Performed by: NURSE PRACTITIONER

## 2018-09-27 PROCEDURE — 99213 OFFICE O/P EST LOW 20 MIN: CPT | Mod: PBBFAC,PO | Performed by: NURSE PRACTITIONER

## 2018-09-27 PROCEDURE — 99999 PR PBB SHADOW E&M-EST. PATIENT-LVL III: CPT | Mod: PBBFAC,,, | Performed by: NURSE PRACTITIONER

## 2018-09-27 NOTE — PROGRESS NOTES
Subjective:       Patient ID: William Mac is a 83 y.o. male.    Chief Complaint:  reconsulted for Uncontrolled DM.     Diabetes   Pertinent negatives for hypoglycemia include no dizziness or nervousness/anxiousness. Pertinent negatives for diabetes include no chest pain, no fatigue and no weakness.     Pt is a very pleasant 80 y/o wm with TYpe 2 DM since early 1990's, and chronic conditions pending review including  HLP, CAD. Does not have HTN--takes ACE for renal prophylaxis.  No c/o hypoglycemia symptoms.  He is only taking metformin.   BIggest issue is chronic back pain with prior stimulator placement.     Interim Events:    No acute events.  Back pain and now neck pain continues.  Rarely checks glucoses. Wife c/o of legs and feet going out. Unable to complete mission if going to [employer], store, etc. Without needing to sit down.    Memory loss is a concern but wife says its about the same as last visit.       On metformin 1000 bid,       Review of Systems   Constitutional: Negative for activity change, appetite change, fatigue and unexpected weight change.   HENT: Positive for hearing loss. Negative for trouble swallowing.    Eyes: Negative for photophobia and visual disturbance.   Respiratory: Negative for cough and shortness of breath.    Cardiovascular: Negative for chest pain, palpitations and leg swelling.   Gastrointestinal: Negative for constipation and diarrhea.   Genitourinary: Negative for difficulty urinating and frequency.   Musculoskeletal: Positive for back pain and neck stiffness. Negative for arthralgias and joint swelling.   Skin: Negative for rash and wound.   Neurological: Negative for dizziness, syncope, weakness and numbness.   Psychiatric/Behavioral: Negative for agitation. The patient is not nervous/anxious.        Objective:      Physical Exam   Constitutional: He is oriented to person, place, and time. He appears well-developed and well-nourished. No distress.   Appears approx age, well  groomed, NAD   HENT:   Head: Normocephalic and atraumatic.   Nose: Nose normal.   Mouth/Throat: Oropharynx is clear and moist.   Eyes: Conjunctivae and EOM are normal. Pupils are equal, round, and reactive to light.   Neck: Normal range of motion. Neck supple. No tracheal deviation present. No thyromegaly present.   Cardiovascular: Normal rate, regular rhythm, normal heart sounds and intact distal pulses. Exam reveals no friction rub.   No murmur heard.  Pulmonary/Chest: Effort normal and breath sounds normal. No respiratory distress. He has no wheezes.   Musculoskeletal: Normal range of motion. He exhibits no edema.   Ambulates with cane.     Feet: no open wounds or calluses. Good pedal care        Lymphadenopathy:     He has no cervical adenopathy.   Neurological: He is alert and oriented to person, place, and time. He has normal reflexes. No cranial nerve deficit. He exhibits normal muscle tone. Coordination normal.   Vibratory sensation to feet intact bilaterally    Skin: Skin is warm and dry. No rash noted. He is not diaphoretic. No erythema.   Psychiatric: He has a normal mood and affect. His behavior is normal. Judgment and thought content normal.       Hemoglobin A1C   Date Value Ref Range Status   09/21/2018 6.4 (H) 4.0 - 5.6 % Final     Comment:     ADA Screening Guidelines:  5.7-6.4%  Consistent with prediabetes  >or=6.5%  Consistent with diabetes  High levels of fetal hemoglobin interfere with the HbA1C  assay. Heterozygous hemoglobin variants (HbS, HgC, etc)do  not significantly interfere with this assay.   However, presence of multiple variants may affect accuracy.     11/29/2017 6.6 (H) 4.0 - 5.6 % Final     Comment:     According to ADA guidelines, hemoglobin A1c <7.0% represents  optimal control in non-pregnant diabetic patients. Different  metrics may apply to specific patient populations.   Standards of Medical Care in Diabetes-2016.  For the purpose of screening for the presence of  diabetes:  <5.7%     Consistent with the absence of diabetes  5.7-6.4%  Consistent with increasing risk for diabetes   (prediabetes)  >or=6.5%  Consistent with diabetes  Currently, no consensus exists for use of hemoglobin A1c  for diagnosis of diabetes for children.  This Hemoglobin A1c assay has significant interference with fetal   hemoglobin   (HbF). The results are invalid for patients with abnormal amounts of   HbF,   including those with known Hereditary Persistence   of Fetal Hemoglobin. Heterozygous hemoglobin variants (HbAS, HbAC,   HbAD, HbAE, HbA2) do not significantly interfere with this assay;   however, presence of multiple variants in a sample may impact the %   interference.     08/04/2017 7.1 (H) 4.0 - 5.6 % Final     Comment:     According to ADA guidelines, hemoglobin A1c <7.0% represents  optimal control in non-pregnant diabetic patients. Different  metrics may apply to specific patient populations.   Standards of Medical Care in Diabetes-2016.  For the purpose of screening for the presence of diabetes:  <5.7%     Consistent with the absence of diabetes  5.7-6.4%  Consistent with increasing risk for diabetes   (prediabetes)  >or=6.5%  Consistent with diabetes  Currently, no consensus exists for use of hemoglobin A1c  for diagnosis of diabetes for children.  This Hemoglobin A1c assay has significant interference with fetal   hemoglobin   (HbF). The results are invalid for patients with abnormal amounts of   HbF,   including those with known Hereditary Persistence   of Fetal Hemoglobin. Heterozygous hemoglobin variants (HbAS, HbAC,   HbAD, HbAE, HbA2) do not significantly interfere with this assay;   however, presence of multiple variants in a sample may impact the %   interference.         Chemistry        Component Value Date/Time     09/21/2018 0802    K 4.0 09/21/2018 0802     09/21/2018 0802    CO2 25 09/21/2018 0802    BUN 13 09/21/2018 0802    CREATININE 0.8 09/21/2018 0802      (H) 09/21/2018 0802        Component Value Date/Time    CALCIUM 9.6 09/21/2018 0802    ALKPHOS 68 09/21/2018 0802    AST 31 09/21/2018 0802    AST 29 01/14/2016 1135    ALT 33 09/21/2018 0802    BILITOT 0.4 09/21/2018 0802        Lab Results   Component Value Date    LDLCALC 60.2 (L) 09/21/2018     Lab Results   Component Value Date    TSH 1.613 09/21/2018       Assessment:     1. Type II diabetes mellitus with neurological manifestations  Chronic-stable-no change   2. Coronary artery disease, angina presence unspecified, unspecified vessel or lesion type, unspecified whether native or transplanted heart  -chronic-stable-avoid hypoglycemai    3. Hyperlipidemia, unspecified hyperlipidemia type  -chronic-stable-cont staing    4.       Peripheral neuropathy ----------chronic-stable-monitor foot care.   5.       Ingrown toenail--new diagnosis--high risk with his self debridement--advised against.      Plan:     continue metformin  1000 bid,  Cr 0.9.         ORDERS   8/17/17  A1C only rosalee day as his wife--Cailin Mac     6  mo with a1c prior    Cons podiatry --ingrown toenail.                  Subjective:       Patient ID: William Mac is a 83 y.o. male.    Chief Complaint:  reconsulted for Uncontrolled DM.     HPI  Pt is a very pleasant 78 y/o wm with TYpe 2 DM since early 1990's, and chronic conditions pending review including  HLP, CAD. Does not have HTN--takes ACE for renal prophylaxis.  No c/o hypoglycemia symptoms.  He is only taking metformin.   BIggest issue is chronic back pain with prior stimulator placement.     Interim Events:    No acute events.  Back pain and now neck pain continues.  Rarely checks glucoses.  Memory loss is a concern but wife says its about the same as last visit.       On metformin 1000 bid,       Review of Systems   Constitutional: Negative for activity change, appetite change, fatigue and unexpected weight change.   HENT: Positive for hearing loss. Negative for trouble  swallowing.    Eyes: Negative for photophobia and visual disturbance.   Respiratory: Negative for cough and shortness of breath.    Cardiovascular: Negative for chest pain, palpitations and leg swelling.   Gastrointestinal: Negative for constipation and diarrhea.   Genitourinary: Negative for difficulty urinating and frequency.   Musculoskeletal: Positive for back pain and neck stiffness. Negative for arthralgias and joint swelling.   Skin: Negative for rash and wound.   Neurological: Negative for dizziness, syncope, weakness and numbness.   Psychiatric/Behavioral: Negative for agitation. The patient is not nervous/anxious.        Objective:      Physical Exam   Constitutional: He is oriented to person, place, and time. He appears well-developed and well-nourished. No distress.   Appears approx age, well groomed, NAD   HENT:   Head: Normocephalic and atraumatic.   Nose: Nose normal.   Mouth/Throat: Oropharynx is clear and moist.   Eyes: Conjunctivae and EOM are normal. Pupils are equal, round, and reactive to light.   Neck: Normal range of motion. Neck supple. No tracheal deviation present. No thyromegaly present.   Cardiovascular: Normal rate, regular rhythm, normal heart sounds and intact distal pulses.  Exam reveals no friction rub.    No murmur heard.  Pulmonary/Chest: Effort normal and breath sounds normal. No respiratory distress. He has no wheezes.   Musculoskeletal: Normal range of motion. He exhibits no edema.   Ambulates with cane.     L great toe showing propensity to ingrow and he has been chiseling out with possibly a pocketknife--counseled against.     Feet: no open wounds or calluses. Good pedal care     Protective Sensation (w/ 10 gram monofilament):  Right: Decreased  Left: Decreased    Visual Inspection:  Normal -  Bilateral    Pedal Pulses:   Right: Diminshed  Left: Diminshed    Posterior tibialis:   Right:Absent  Left: Absent     Lymphadenopathy:     He has no cervical adenopathy.   Neurological:  He is alert and oriented to person, place, and time. He has normal reflexes. No cranial nerve deficit. He exhibits normal muscle tone. Coordination normal.   Vibratory sensation to feet intact bilaterally    Skin: Skin is warm and dry. No rash noted. He is not diaphoretic. No erythema.   Psychiatric: He has a normal mood and affect. His behavior is normal. Judgment and thought content normal.       Hemoglobin A1C   Date Value Ref Range Status   09/21/2018 6.4 (H) 4.0 - 5.6 % Final     Comment:     ADA Screening Guidelines:  5.7-6.4%  Consistent with prediabetes  >or=6.5%  Consistent with diabetes  High levels of fetal hemoglobin interfere with the HbA1C  assay. Heterozygous hemoglobin variants (HbS, HgC, etc)do  not significantly interfere with this assay.   However, presence of multiple variants may affect accuracy.     11/29/2017 6.6 (H) 4.0 - 5.6 % Final     Comment:     According to ADA guidelines, hemoglobin A1c <7.0% represents  optimal control in non-pregnant diabetic patients. Different  metrics may apply to specific patient populations.   Standards of Medical Care in Diabetes-2016.  For the purpose of screening for the presence of diabetes:  <5.7%     Consistent with the absence of diabetes  5.7-6.4%  Consistent with increasing risk for diabetes   (prediabetes)  >or=6.5%  Consistent with diabetes  Currently, no consensus exists for use of hemoglobin A1c  for diagnosis of diabetes for children.  This Hemoglobin A1c assay has significant interference with fetal   hemoglobin   (HbF). The results are invalid for patients with abnormal amounts of   HbF,   including those with known Hereditary Persistence   of Fetal Hemoglobin. Heterozygous hemoglobin variants (HbAS, HbAC,   HbAD, HbAE, HbA2) do not significantly interfere with this assay;   however, presence of multiple variants in a sample may impact the %   interference.     08/04/2017 7.1 (H) 4.0 - 5.6 % Final     Comment:     According to ADA guidelines,  hemoglobin A1c <7.0% represents  optimal control in non-pregnant diabetic patients. Different  metrics may apply to specific patient populations.   Standards of Medical Care in Diabetes-2016.  For the purpose of screening for the presence of diabetes:  <5.7%     Consistent with the absence of diabetes  5.7-6.4%  Consistent with increasing risk for diabetes   (prediabetes)  >or=6.5%  Consistent with diabetes  Currently, no consensus exists for use of hemoglobin A1c  for diagnosis of diabetes for children.  This Hemoglobin A1c assay has significant interference with fetal   hemoglobin   (HbF). The results are invalid for patients with abnormal amounts of   HbF,   including those with known Hereditary Persistence   of Fetal Hemoglobin. Heterozygous hemoglobin variants (HbAS, HbAC,   HbAD, HbAE, HbA2) do not significantly interfere with this assay;   however, presence of multiple variants in a sample may impact the %   interference.         Chemistry        Component Value Date/Time     09/21/2018 0802    K 4.0 09/21/2018 0802     09/21/2018 0802    CO2 25 09/21/2018 0802    BUN 13 09/21/2018 0802    CREATININE 0.8 09/21/2018 0802     (H) 09/21/2018 0802        Component Value Date/Time    CALCIUM 9.6 09/21/2018 0802    ALKPHOS 68 09/21/2018 0802    AST 31 09/21/2018 0802    AST 29 01/14/2016 1135    ALT 33 09/21/2018 0802    BILITOT 0.4 09/21/2018 0802        Lab Results   Component Value Date    LDLCALC 60.2 (L) 09/21/2018     Lab Results   Component Value Date    TSH 1.613 09/21/2018       Assessment:     1. Type II diabetes mellitus with neurological manifestations  Chronic-stable-no change    2. Essential hypertension  Chronic-stable-no change    3. Coronary artery disease, angina presence unspecified, unspecified vessel or lesion type, unspecified whether native or transplanted heart  Chronic-stable-avoid hypoglycemia.            Plan:     continue metformin  1000 bid,  Cr 0.9.         ORDERS  09/27/2018  Pt doing great.   May f/u primary care for DM monitoring.

## 2018-09-28 ENCOUNTER — TELEPHONE (OUTPATIENT)
Dept: FAMILY MEDICINE | Facility: CLINIC | Age: 83
End: 2018-09-28

## 2018-09-28 DIAGNOSIS — R29.898 WEAKNESS OF LOWER EXTREMITY, UNSPECIFIED LATERALITY: Primary | ICD-10-CM

## 2018-09-28 NOTE — TELEPHONE ENCOUNTER
Wife is asking for orders for lightweight wheelchair for pt. States he has a hard time walking long distances due to weakness in lower extremities. Has rollator but has times when he cannot move his legs.

## 2018-11-01 ENCOUNTER — TELEPHONE (OUTPATIENT)
Dept: FAMILY MEDICINE | Facility: CLINIC | Age: 83
End: 2018-11-01

## 2018-11-01 NOTE — TELEPHONE ENCOUNTER
----- Message from Mellisa Cornell sent at 11/1/2018  8:50 AM CDT -----  Contact: Wife Cailin Mac  Wife is needing to get a wheel chair for for patient and needs to know the steps to take to get this     Please call back [phone]

## 2018-11-14 ENCOUNTER — OFFICE VISIT (OUTPATIENT)
Dept: FAMILY MEDICINE | Facility: CLINIC | Age: 83
End: 2018-11-14
Payer: MEDICARE

## 2018-11-14 VITALS
TEMPERATURE: 98 F | BODY MASS INDEX: 26.47 KG/M2 | HEIGHT: 73 IN | OXYGEN SATURATION: 95 % | WEIGHT: 199.75 LBS | SYSTOLIC BLOOD PRESSURE: 131 MMHG | HEART RATE: 64 BPM | DIASTOLIC BLOOD PRESSURE: 85 MMHG | RESPIRATION RATE: 18 BRPM

## 2018-11-14 DIAGNOSIS — R26.0 ATAXIC GAIT: Primary | ICD-10-CM

## 2018-11-14 DIAGNOSIS — E11.42 DIABETIC POLYNEUROPATHY ASSOCIATED WITH TYPE 2 DIABETES MELLITUS: ICD-10-CM

## 2018-11-14 DIAGNOSIS — Z74.09 IMPAIRED FUNCTIONAL MOBILITY, BALANCE, GAIT, AND ENDURANCE: ICD-10-CM

## 2018-11-14 DIAGNOSIS — R20.0 NUMBNESS IN BOTH LEGS: ICD-10-CM
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
## 2018-11-14 DIAGNOSIS — G89.4 CHRONIC PAIN SYNDROME: ICD-10-CM

## 2018-11-14 PROCEDURE — 99213 OFFICE O/P EST LOW 20 MIN: CPT | Mod: S$PBB,,, | Performed by: NURSE PRACTITIONER

## 2018-11-14 PROCEDURE — 99999 PR PBB SHADOW E&M-EST. PATIENT-LVL IV: CPT | Mod: PBBFAC,,, | Performed by: NURSE PRACTITIONER

## 2018-11-14 PROCEDURE — 99214 OFFICE O/P EST MOD 30 MIN: CPT | Mod: PBBFAC,PO | Performed by: NURSE PRACTITIONER

## 2018-11-14 RX ORDER — KETOCONAZOLE 20 MG/G
CREAM TOPICAL
Refills: 5 | COMMUNITY
Start: 2018-08-16 | End: 2020-01-01 | Stop reason: CLARIF

## 2018-11-14 RX ORDER — FLUOCINOLONE ACETONIDE 0.11 MG/ML
OIL AURICULAR (OTIC)
COMMUNITY
End: 2020-01-01 | Stop reason: CLARIF

## 2018-11-14 RX ORDER — CYCLOBENZAPRINE HCL 5 MG
TABLET ORAL
COMMUNITY
Start: 2018-09-06 | End: 2020-01-01 | Stop reason: CLARIF

## 2018-11-14 NOTE — PROGRESS NOTES
Subjective:       Patient ID: William Mac is a 83 y.o. male.    Chief Complaint: Wheelchair  He was last seen in primary care by Bettye on 08/01/2018  This is his first time seeing me in the clinic  He is accompanied by his wife  HPI   He is here requesting an order for a light weight folding wheel chair.  States progressively having more ambulation problems and concerns about safety. He does have a Rolator at home the he purchased. However, states needs wheelchair at times because of mobility difficulty. Wife unable to handle a heavy wheelchair.  Vitals:    11/14/18 0827   BP: 131/85   Pulse: 64   Resp: 18   Temp: 97.6 °F (36.4 °C)     Review of Systems   Musculoskeletal: Positive for gait problem.       Patient and wife states he does have enough upper arm strength to use a wheel chair.  He needs a light weight chair so his wife can assist him with ambulation.   He has 2 falls in his home in the past 2 months  Objective:      Physical Exam   Constitutional: Vital signs are normal. He appears well-developed and well-nourished. He is active and cooperative.   He does have a straight cane today and is in an [facility] wheelchair during this visit   HENT:   Head: Normocephalic and atraumatic.   Right Ear: Hearing, tympanic membrane, external ear and ear canal normal.   Left Ear: Hearing, tympanic membrane, external ear and ear canal normal.   Nose: Nose normal.   Mouth/Throat: Uvula is midline, oropharynx is clear and moist and mucous membranes are normal.   Eyes: Lids are normal.   Neck: Trachea normal, normal range of motion, full passive range of motion without pain and phonation normal. Neck supple.   Cardiovascular: Normal rate, regular rhythm and normal heart sounds.   Pulmonary/Chest: Effort normal and breath sounds normal.   Abdominal: Soft. Bowel sounds are normal. There is no splenomegaly or hepatomegaly. There is no tenderness.   Musculoskeletal:        Right knee: He exhibits decreased range of motion.         Left knee: He exhibits decreased range of motion.   Lymphadenopathy:        Head (right side): No submental, no submandibular, no tonsillar, no preauricular, no posterior auricular and no occipital adenopathy present.        Head (left side): No submental, no submandibular, no tonsillar, no preauricular, no posterior auricular and no occipital adenopathy present.     He has no cervical adenopathy.   Neurological: He is alert.   Skin: Skin is warm, dry and intact.   Psychiatric: He has a normal mood and affect. His speech is normal and behavior is normal. Judgment and thought content normal. Cognition and memory are normal.   Nursing note and vitals reviewed.      Assessment & Plan:       Ataxic gait  -     WHEELCHAIR FOR HOME USE    Diabetic polyneuropathy associated with type 2 diabetes mellitus  -     WHEELCHAIR FOR HOME USE    Numbness in both legs  -     WHEELCHAIR FOR HOME USE    Impaired functional mobility, balance, gait, and endurance  -     WHEELCHAIR FOR HOME USE    Chronic pain syndrome  -     WHEELCHAIR FOR HOME USE    Fall in home, sequela         Medication List           Accurate as of 11/14/18 11:59 PM. If you have any questions, ask your nurse or doctor.               CONTINUE taking these medications    aspirin 81 MG EC tablet  Commonly known as:  ECOTRIN     cyclobenzaprine 5 MG tablet  Commonly known as:  FLEXERIL     diclofenac sodium 1 % Gel  Commonly known as:  VOLTAREN  Apply 2 g topically once daily.     fluocinolone acetonide oil 0.01 % Drop     HYDROcodone-acetaminophen 5-325 mg per tablet  Commonly known as:  NORCO     ketoconazole 2 % cream  Commonly known as:  NIZORAL     loratadine 10 mg tablet  Commonly known as:  CLARITIN  Take 1 tablet (10 mg total) by mouth once daily.     metFORMIN 1000 MG tablet  Commonly known as:  GLUCOPHAGE  Take 1 tablet (1,000 mg total) by mouth 2 (two) times daily.     MULTIVITAMIN, STRESS FORMULA ORAL     pravastatin 40 MG tablet  Commonly known as:   PRAVACHOL  Take 1 tablet (40 mg total) by mouth nightly.     senna 8.6 mg tablet  Commonly known as:  SENOKOT     sertraline 50 MG tablet  Commonly known as:  ZOLOFT  Take 1 tablet (50 mg total) by mouth once daily.        STOP taking these medications    azelastine 137 mcg (0.1 %) nasal spray  Commonly known as:  ASTELIN  Stopped by:  Cat Bauman DNP, APRN     docusate sodium 100 MG capsule  Commonly known as:  COLACE  Stopped by:  Cat Bauman DNP, CLEMENTINA              No Follow-up on file.

## 2018-11-16 DIAGNOSIS — E11.69 TYPE 2 DIABETES MELLITUS WITH HYPERLIPIDEMIA: ICD-10-CM

## 2018-11-16 DIAGNOSIS — F32.A DEPRESSION, UNSPECIFIED DEPRESSION TYPE: ICD-10-CM

## 2018-11-16 DIAGNOSIS — E78.5 TYPE 2 DIABETES MELLITUS WITH HYPERLIPIDEMIA: ICD-10-CM

## 2018-11-16 RX ORDER — LORATADINE 10 MG/1
TABLET ORAL
Qty: 90 TABLET | Refills: 0 | Status: SHIPPED | OUTPATIENT
Start: 2018-11-16 | End: 2019-04-03 | Stop reason: SDUPTHER

## 2018-11-16 RX ORDER — SERTRALINE HYDROCHLORIDE 50 MG/1
TABLET, FILM COATED ORAL
Qty: 90 TABLET | Refills: 1 | Status: SHIPPED | OUTPATIENT
Start: 2018-11-16 | End: 2019-07-05 | Stop reason: SDUPTHER

## 2018-11-16 RX ORDER — PRAVASTATIN SODIUM 40 MG/1
TABLET ORAL
Qty: 90 TABLET | Refills: 0 | Status: SHIPPED | OUTPATIENT
Start: 2018-11-16 | End: 2019-03-12 | Stop reason: SDUPTHER

## 2018-11-19 ENCOUNTER — TELEPHONE (OUTPATIENT)
Dept: FAMILY MEDICINE | Facility: CLINIC | Age: 83
End: 2018-11-19

## 2018-11-19 NOTE — TELEPHONE ENCOUNTER
----- Message from Mellisa Cornell sent at 11/19/2018  8:10 AM CST -----  Contact: Daughter Lorie Schofield   Daughter Lorie Schofield needs to ask the nurse a general question about Home Health and Medicare     Please call back [phone]

## 2018-11-19 NOTE — TELEPHONE ENCOUNTER
Spoke to pt daughter and she thinks the pt would benefit from either home health or palliative care and wanted to know the steps she should take. Advised her that Dr. Wen would put those orders in. She asked if we knew which facility his insurance would cover. Advised her that she would need to contact the insurance company to get that information. She verbalized understanding. States she will call back to let us know.

## 2018-12-07 RX ORDER — FLUOCINOLONE ACETONIDE 0.11 MG/ML
OIL AURICULAR (OTIC)
OUTPATIENT
Start: 2018-12-07

## 2019-01-01 DIAGNOSIS — G20.A1 PARKINSON'S DISEASE: ICD-10-CM

## 2019-01-01 RX ORDER — CARBIDOPA AND LEVODOPA 25; 100 MG/1; MG/1
2 TABLET ORAL 3 TIMES DAILY
Qty: 180 TABLET | Refills: 3 | Status: SHIPPED | OUTPATIENT
Start: 2019-01-01 | End: 2020-01-01

## 2019-01-09 RX ORDER — METFORMIN HYDROCHLORIDE 1000 MG/1
TABLET ORAL
Qty: 180 TABLET | Refills: 0 | Status: SHIPPED | OUTPATIENT
Start: 2019-01-09 | End: 2019-04-23 | Stop reason: SDUPTHER

## 2019-01-19 ENCOUNTER — PATIENT OUTREACH (OUTPATIENT)
Dept: ADMINISTRATIVE | Facility: HOSPITAL | Age: 84
End: 2019-01-19

## 2019-01-19 NOTE — LETTER
January 19, 2019    William Mac  [address] [address] [address]             [facility]  [address]  [facility] [address]  Phone: [phone] Dear Mr. Mac:    [facility] is committed to your overall health.  To help you get the most out of each of your visits, we will review your information to make sure you are up to date on all of your recommended tests and/or procedures.      W Hussain Wen MD    has found that your chart shows you may be due for the following:    Diabetic Foot Exam      If you have had any of the above done at another facility, please bring the records or information with you so that your record at [facility] will be complete.  If you would like to schedule any of these, please contact me.    If you are currently taking medication, please bring it with you to your appointment for review.      Sincerely,    Na Zhang  Clinical Care Coordinator  [facility] [address] [place] [address]  Phone: [phone]   Fax: [phone]

## 2019-01-19 NOTE — PROGRESS NOTES
Health Maintenance Due   Topic Date Due    Foot Exam  12/27/2018     Pre-visit outreach via mail

## 2019-01-28 ENCOUNTER — TELEPHONE (OUTPATIENT)
Dept: FAMILY MEDICINE | Facility: CLINIC | Age: 84
End: 2019-01-28

## 2019-01-28 NOTE — TELEPHONE ENCOUNTER
----- Message from Merle Butt sent at 1/28/2019  7:44 AM CST -----  Please call wife Cailin Palumbo / [phone] .. Pain management is suggesting he see a neurology (looks like he is having mini strokes/ memory loss / legs will not hold him up ?) will need a referral

## 2019-01-28 NOTE — TELEPHONE ENCOUNTER
Spoke with wife, how reports decline in patient cognitives and physical function. Is requesting a referral to neurology, but does not want any further testing for patient.   Advised that the patient has appointment at the end of the week, and if she likes, she can discuss symptoms with PCP at that time.   Voices understanding. And is okay with waiting to speak directly with Dr Wen at appointment.

## 2019-01-29 ENCOUNTER — TELEPHONE (OUTPATIENT)
Dept: NEUROLOGY | Facility: CLINIC | Age: 84
End: 2019-01-29

## 2019-01-29 NOTE — TELEPHONE ENCOUNTER
----- Message from Merle Butt sent at 1/29/2019  9:30 AM CST -----   Type:  Sooner Apoointment Request    Caller is requesting a sooner appointment.  Daughter requesting a message be sent to doctor.    Name of Caller:  Livier Robinsntire  When is the first available appointment?     Symptoms:  Hip pain/ dementia   Best Call Back Number:  [phone]   Additional Information:  States he has been seen and had nerve conductive test done

## 2019-01-29 NOTE — TELEPHONE ENCOUNTER
Returned call and spoke with patient's daughter, Livier. Follow up appointment scheduled for 02/06/2019 at 3:30 pm. Livier verbalized understanding.

## 2019-02-06 ENCOUNTER — OFFICE VISIT (OUTPATIENT)
Dept: NEUROLOGY | Facility: CLINIC | Age: 84
End: 2019-02-06
Payer: MEDICARE

## 2019-02-06 VITALS
WEIGHT: 197 LBS | DIASTOLIC BLOOD PRESSURE: 62 MMHG | HEART RATE: 67 BPM | HEIGHT: 73 IN | BODY MASS INDEX: 26.11 KG/M2 | RESPIRATION RATE: 16 BRPM | SYSTOLIC BLOOD PRESSURE: 119 MMHG

## 2019-02-06 DIAGNOSIS — E53.8 VITAMIN B12 DEFICIENCY: ICD-10-CM

## 2019-02-06 DIAGNOSIS — R27.8 SENSORY ATAXIA: ICD-10-CM

## 2019-02-06 DIAGNOSIS — E11.42 DIABETIC POLYNEUROPATHY ASSOCIATED WITH TYPE 2 DIABETES MELLITUS: ICD-10-CM

## 2019-02-06 DIAGNOSIS — R26.0 ATAXIC GAIT: Primary | ICD-10-CM

## 2019-02-06 DIAGNOSIS — G20.C PARKINSONISM, UNSPECIFIED PARKINSONISM TYPE: ICD-10-CM

## 2019-02-06 DIAGNOSIS — G93.89 CEREBRAL VENTRICULOMEGALY: ICD-10-CM

## 2019-02-06 DIAGNOSIS — R41.89 COGNITIVE IMPAIRMENT: ICD-10-CM

## 2019-02-06 PROCEDURE — 99215 PR OFFICE/OUTPT VISIT, EST, LEVL V, 40-54 MIN: ICD-10-PCS | Mod: S$PBB,,, | Performed by: PSYCHIATRY & NEUROLOGY

## 2019-02-06 PROCEDURE — 99213 OFFICE O/P EST LOW 20 MIN: CPT | Mod: PBBFAC,PO | Performed by: PSYCHIATRY & NEUROLOGY

## 2019-02-06 PROCEDURE — 99215 OFFICE O/P EST HI 40 MIN: CPT | Mod: S$PBB,,, | Performed by: PSYCHIATRY & NEUROLOGY

## 2019-02-06 PROCEDURE — 99999 PR PBB SHADOW E&M-EST. PATIENT-LVL III: CPT | Mod: PBBFAC,,, | Performed by: PSYCHIATRY & NEUROLOGY

## 2019-02-06 PROCEDURE — 99999 PR PBB SHADOW E&M-EST. PATIENT-LVL III: ICD-10-PCS | Mod: PBBFAC,,, | Performed by: PSYCHIATRY & NEUROLOGY

## 2019-02-06 RX ORDER — CARBIDOPA AND LEVODOPA 25; 100 MG/1; MG/1
1 TABLET ORAL 3 TIMES DAILY
Qty: 120 TABLET | Refills: 1 | Status: SHIPPED | OUTPATIENT
Start: 2019-02-06 | End: 2019-02-22 | Stop reason: SDUPTHER

## 2019-02-06 NOTE — PATIENT INSTRUCTIONS
Diagnoses considered -   Parkinson disease  Lewy Body dementia  Normal pressure hydrocephalus     TESTING:  -- order a new CT of your head     TREATMENT:  -- start Sinemet (carbidopa/levodopa) 25/100mg trial -  * 1 tablet three times a day x 3 days then -   * 1 and a half tablet 3 times per day x 3 days then =   * 2 tablets 3 times per day     We are looking for your walking to become MUCH better in the 2-4 hours after each dose of Sinemet - please keep track of this response and let Dr. Gomez know     If you are having nausea or diarrhea then reduce the dose
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
## 2019-02-07 PROBLEM — R27.8 SENSORY ATAXIA: Status: ACTIVE | Noted: 2019-02-07

## 2019-02-07 PROBLEM — G93.89 CEREBRAL VENTRICULOMEGALY: Status: ACTIVE | Noted: 2019-02-07

## 2019-02-07 PROBLEM — G20.C PARKINSONISM: Status: ACTIVE | Noted: 2019-02-07

## 2019-02-07 PROBLEM — R41.89 COGNITIVE IMPAIRMENT: Status: ACTIVE | Noted: 2019-02-07

## 2019-02-14 ENCOUNTER — HOSPITAL ENCOUNTER (OUTPATIENT)
Dept: RADIOLOGY | Facility: HOSPITAL | Age: 84
Discharge: HOME | End: 2019-02-14
Attending: PSYCHIATRY & NEUROLOGY
Payer: MEDICARE

## 2019-02-14 DIAGNOSIS — R26.0 ATAXIC GAIT: ICD-10-CM

## 2019-02-14 PROCEDURE — 70450 CT HEAD/BRAIN W/O DYE: CPT | Mod: TC,PO

## 2019-02-14 PROCEDURE — 70450 CT HEAD/BRAIN W/O DYE: CPT | Mod: 26,,, | Performed by: RADIOLOGY

## 2019-02-14 PROCEDURE — 70450 CT HEAD WITHOUT CONTRAST: ICD-10-PCS | Mod: 26,,, | Performed by: RADIOLOGY

## 2019-02-22 DIAGNOSIS — G20.C PARKINSONISM, UNSPECIFIED PARKINSONISM TYPE: ICD-10-CM

## 2019-02-22 RX ORDER — CARBIDOPA AND LEVODOPA 25; 100 MG/1; MG/1
1 TABLET ORAL 3 TIMES DAILY
Qty: 90 TABLET | Refills: 0 | Status: SHIPPED | OUTPATIENT
Start: 2019-02-22 | End: 2019-05-10

## 2019-02-22 NOTE — TELEPHONE ENCOUNTER
----- Message from Nanci Hall sent at 2/22/2019 10:18 AM CST -----  Contact: wife                                              attn: Dayna  Wife - Cailin Mac - [phone] is calling to speak with Nurse Dayna/patient has an appt on 03 01 19 and does not have enough medication to last until then/requesting a refill Carbidopa Levodopa/     [facility] - [place] - [address]  [address]  [place] [address]  Phone: [phone] Fax: [phone]

## 2019-02-25 ENCOUNTER — TELEPHONE (OUTPATIENT)
Dept: NEUROLOGY | Facility: CLINIC | Age: 84
End: 2019-02-25

## 2019-02-25 NOTE — TELEPHONE ENCOUNTER
"Return[facility] call and [facility]. Sinemet #90 is only a 15 day supply. Verbal given to increase to #180 for a one month supply.     Per Dr. Gomez's note in the patient's after visit summary, "-- start Sinemet (carbidopa/levodopa) 25/100mg trial -* 1 tablet three times a day x 3 days then - * 1 and a half tablet 3 times per day x 3 days then = * 2 tablets 3 times per day".    "

## 2019-02-25 NOTE — TELEPHONE ENCOUNTER
----- Message from Adrianna Cedeno sent at 2/25/2019  9:15 AM CST -----  Contact: Monisha  Type:  Pharmacy Calling to Clarify an RX    Name of Caller:  Monisha  Pharmacy Name:  [facility]  Prescription Name:  carbidopa-levodopa  mg (SINEMET)  mg per tablet  What do they need to clarify?:  Need to clarify how many tabs daily?  Best Call Back Number:  [phone]  Additional Information:  Requesting a call back to advise

## 2019-02-27 ENCOUNTER — TELEPHONE (OUTPATIENT)
Dept: NEUROLOGY | Facility: CLINIC | Age: 84
End: 2019-02-27

## 2019-02-27 NOTE — TELEPHONE ENCOUNTER
Returned called and spoke patient's wife, Cailin. The pharmacy told them there was an error on our end and only gave the patient #9 of Sinemet.       Called and spoke with pharmacist Monisha at [facility]. There was originally a prescribing error on 02/22/2019 and #9 was given to ensure the patient would not run out until the prescription was fixed. I spoke with Monisha on 02/25/2019 and a verbal was given to clarify the prescription. The verbal order prescription order given on 02/25/2019 has been ready to  with #180 tablets with a $20 co-pay.       Called and spoke with patient's wife Cailin. Informed her of the information over with the pharmacist. Cailin verbalized understanding.

## 2019-02-27 NOTE — TELEPHONE ENCOUNTER
----- Message from Vanessa Reagan sent at 2/27/2019 10:37 AM CST -----  Type:  RX Refill Request    Who Called:  Wife- Cailin Mac  Refill or New Rx:  refill  RX Name and Strength:  carbidopa-levodopa  mg (SINEMET)  mg per tablet  How is the patient currently taking it? (ex. 1XDay):  Na  Is this a 30 day or 90 day RX:  30  Preferred Pharmacy with phone number:    [facility] [place] - [id number]  [id number]  [facility] [address]  Phone: [phone] Fax: [phone]  Local or Mail Order:  local  Ordering Provider:  Jason Millan Call Back Number:  [phone] (home)     Additional Information:  Stating the patient will not have enough medication to last until Friday, next appt with Dr. Gomez

## 2019-03-01 ENCOUNTER — OFFICE VISIT (OUTPATIENT)
Dept: NEUROLOGY | Facility: CLINIC | Age: 84
End: 2019-03-01
Payer: MEDICARE

## 2019-03-01 VITALS
HEIGHT: 73 IN | RESPIRATION RATE: 16 BRPM | BODY MASS INDEX: 26.12 KG/M2 | HEART RATE: 70 BPM | DIASTOLIC BLOOD PRESSURE: 65 MMHG | SYSTOLIC BLOOD PRESSURE: 124 MMHG | WEIGHT: 197.06 LBS

## 2019-03-01 DIAGNOSIS — R41.89 COGNITIVE IMPAIRMENT: ICD-10-CM

## 2019-03-01 DIAGNOSIS — Z96.89 STATUS POST INSERTION OF SPINAL CORD STIMULATOR: ICD-10-CM

## 2019-03-01 DIAGNOSIS — G20.A1 PARKINSON'S DISEASE: Primary | ICD-10-CM

## 2019-03-01 DIAGNOSIS — E53.8 VITAMIN B12 DEFICIENCY: ICD-10-CM

## 2019-03-01 PROCEDURE — 99215 OFFICE O/P EST HI 40 MIN: CPT | Mod: S$PBB,,, | Performed by: PSYCHIATRY & NEUROLOGY

## 2019-03-01 PROCEDURE — 99215 PR OFFICE/OUTPT VISIT, EST, LEVL V, 40-54 MIN: ICD-10-PCS | Mod: S$PBB,,, | Performed by: PSYCHIATRY & NEUROLOGY

## 2019-03-01 PROCEDURE — 99999 PR PBB SHADOW E&M-EST. PATIENT-LVL III: CPT | Mod: PBBFAC,,, | Performed by: PSYCHIATRY & NEUROLOGY

## 2019-03-01 PROCEDURE — 99999 PR PBB SHADOW E&M-EST. PATIENT-LVL III: ICD-10-PCS | Mod: PBBFAC,,, | Performed by: PSYCHIATRY & NEUROLOGY

## 2019-03-01 PROCEDURE — 99213 OFFICE O/P EST LOW 20 MIN: CPT | Mod: PBBFAC,PO | Performed by: PSYCHIATRY & NEUROLOGY

## 2019-03-01 RX ORDER — CYANOCOBALAMIN 1000 UG/ML
INJECTION, SOLUTION INTRAMUSCULAR; SUBCUTANEOUS
Qty: 1 ML | Refills: 4 | Status: SHIPPED | OUTPATIENT
Start: 2019-03-01 | End: 2020-01-01 | Stop reason: CLARIF

## 2019-03-01 RX ORDER — CYANOCOBALAMIN 1000 UG/ML
INJECTION, SOLUTION INTRAMUSCULAR; SUBCUTANEOUS
Qty: 1 ML | Refills: 11 | Status: SHIPPED | OUTPATIENT
Start: 2019-03-31 | End: 2020-01-01 | Stop reason: CLARIF

## 2019-03-01 RX ORDER — HYDROCODONE BITARTRATE AND ACETAMINOPHEN 7.5; 325 MG/1; MG/1
TABLET ORAL
Refills: 0 | COMMUNITY
Start: 2019-02-12 | End: 2020-01-01 | Stop reason: CLARIF
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
## 2019-03-01 NOTE — PATIENT INSTRUCTIONS
TESTING:  -- none     TREATMENT:  -- continue Sinemet (carbidopa/levodopa) 25/100mg 2 tablets three times per day as needed   -- take the medication on an empty stomach for best effect  -- the medication may cause nausea, cramping, diarrhea at higher doses   -- let me know if Jeffy (for the stimulator) does not reach you

## 2019-03-12 DIAGNOSIS — E11.69 TYPE 2 DIABETES MELLITUS WITH HYPERLIPIDEMIA: ICD-10-CM

## 2019-03-12 DIAGNOSIS — E78.5 TYPE 2 DIABETES MELLITUS WITH HYPERLIPIDEMIA: ICD-10-CM

## 2019-03-12 RX ORDER — PRAVASTATIN SODIUM 40 MG/1
TABLET ORAL
Qty: 90 TABLET | Refills: 0 | Status: SHIPPED | OUTPATIENT
Start: 2019-03-12 | End: 2019-05-10

## 2019-03-20 ENCOUNTER — TELEPHONE (OUTPATIENT)
Dept: NEUROLOGY | Facility: CLINIC | Age: 84
End: 2019-03-20

## 2019-03-20 ENCOUNTER — PATIENT MESSAGE (OUTPATIENT)
Dept: NEUROLOGY | Facility: CLINIC | Age: 84
End: 2019-03-20

## 2019-03-20 NOTE — TELEPHONE ENCOUNTER
----- Message from Nehemias Bauman sent at 3/20/2019 12:55 PM CDT -----  Type:  Sooner Apoointment Request    Caller is requesting a sooner appointment.  Caller declined first available appointment listed below.  Caller will not accept being placed on the waitlist and is requesting a message be sent to doctor.    Name of Caller:  Wife- Cailin Mac  When is the first available appointment?  NA   Symptoms:  NA   Best Call Back Number:  [phone]  Additional Information:  Patient's wife asking to schedule an appointment for the above listed patient referred by Dr Gomez for parkinson disease

## 2019-03-27 ENCOUNTER — TELEPHONE (OUTPATIENT)
Dept: NEUROLOGY | Facility: CLINIC | Age: 84
End: 2019-03-27

## 2019-03-27 NOTE — TELEPHONE ENCOUNTER
----- Message from Alia Rodriguez sent at 3/27/2019  9:33 AM CDT -----  Contact: wife    Pt came to the [facility] today saying they had appt . Please call. Dr obregon is in [place] tomorrow 03 28 2019 . Not on schedule. Pt is wanting to know if they need to come tomorrow to talk to dr obregon... Needs call today, thanks

## 2019-04-03 ENCOUNTER — TELEPHONE (OUTPATIENT)
Dept: NEUROLOGY | Facility: CLINIC | Age: 84
End: 2019-04-03

## 2019-04-03 NOTE — TELEPHONE ENCOUNTER
Returned call and spoke with patient's wife, Cailin. Patient is finished with his weekly injections for a month. Cailin would like to know what to do from here in regards to his B12 injections. Advised Cailin that the patient will now need to get his B12 injections just once per month now. Patient had his last injection on 04/01/2019. Informed Cailin his next injection needs to be on or around 05/01/2019. Instructed Cailin that the prescription has already been sent to [facility] pharmacy with 11 refills and that she will need to contact the pharmacy to fill the prescription before his May dose. Cailin verbalized understanding.

## 2019-04-03 NOTE — TELEPHONE ENCOUNTER
----- Message from RT Benjamin sent at 4/3/2019  9:15 AM CDT -----  Contact: Cailin,wife,[phone]   Cailin,wife,[phone], requesting a call back soon concerning the pt's vitamin  B 12, refill, thanks.

## 2019-04-04 RX ORDER — LORATADINE 10 MG/1
TABLET ORAL
Qty: 90 TABLET | Refills: 0 | Status: SHIPPED | OUTPATIENT
Start: 2019-04-04 | End: 2020-01-01 | Stop reason: CLARIF

## 2019-04-15 ENCOUNTER — TELEPHONE (OUTPATIENT)
Dept: FAMILY MEDICINE | Facility: CLINIC | Age: 84
End: 2019-04-15

## 2019-04-15 DIAGNOSIS — G20.A1 PARKINSON'S DISEASE: Primary | ICD-10-CM

## 2019-04-23 ENCOUNTER — TELEPHONE (OUTPATIENT)
Dept: ADMINISTRATIVE | Facility: CLINIC | Age: 84
End: 2019-04-23

## 2019-04-23 RX ORDER — METFORMIN HYDROCHLORIDE 1000 MG/1
TABLET ORAL
Qty: 180 TABLET | Refills: 0 | Status: SHIPPED | OUTPATIENT
Start: 2019-04-23 | End: 2019-08-06 | Stop reason: SDUPTHER

## 2019-04-23 NOTE — TELEPHONE ENCOUNTER
[facility] SOC with [facility] Cyndy Toth.  Dr. Leonard Wen.  SN, PT, OT, HHA services.

## 2019-05-03 NOTE — TELEPHONE ENCOUNTER
Orders for Walker with wheels was faxed to [employer] At [phone].  
[Negative] : Psychiatric

## 2019-05-10 ENCOUNTER — LAB VISIT (OUTPATIENT)
Dept: LAB | Facility: HOSPITAL | Age: 84
End: 2019-05-10
Attending: NURSE PRACTITIONER
Payer: MEDICARE

## 2019-05-10 ENCOUNTER — OFFICE VISIT (OUTPATIENT)
Dept: FAMILY MEDICINE | Facility: CLINIC | Age: 84
End: 2019-05-10
Payer: MEDICARE

## 2019-05-10 VITALS
DIASTOLIC BLOOD PRESSURE: 60 MMHG | BODY MASS INDEX: 25.86 KG/M2 | OXYGEN SATURATION: 92 % | WEIGHT: 195.13 LBS | HEIGHT: 73 IN | HEART RATE: 60 BPM | SYSTOLIC BLOOD PRESSURE: 130 MMHG

## 2019-05-10 DIAGNOSIS — L03.031 PARONYCHIA OF GREAT TOE, RIGHT: ICD-10-CM

## 2019-05-10 DIAGNOSIS — E11.49 TYPE II DIABETES MELLITUS WITH NEUROLOGICAL MANIFESTATIONS: ICD-10-CM

## 2019-05-10 DIAGNOSIS — R27.8 SENSORY ATAXIA: ICD-10-CM

## 2019-05-10 DIAGNOSIS — G20.A1 PARKINSON'S DISEASE: ICD-10-CM

## 2019-05-10 DIAGNOSIS — L03.032 PARONYCHIA OF TOENAIL OF LEFT FOOT: ICD-10-CM
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
## 2019-05-10 DIAGNOSIS — R49.0 HYPOPHONIA WITH HOARSENESS: ICD-10-CM

## 2019-05-10 LAB
ESTIMATED AVG GLUCOSE: 131 MG/DL (ref 68–131)
HBA1C MFR BLD HPLC: 6.2 % (ref 4–5.6)

## 2019-05-10 PROCEDURE — 99999 PR PBB SHADOW E&M-EST. PATIENT-LVL V: ICD-10-PCS | Mod: PBBFAC,,, | Performed by: NURSE PRACTITIONER

## 2019-05-10 PROCEDURE — 99214 PR OFFICE/OUTPT VISIT, EST, LEVL IV, 30-39 MIN: ICD-10-PCS | Mod: S$PBB,,, | Performed by: NURSE PRACTITIONER

## 2019-05-10 PROCEDURE — 99214 OFFICE O/P EST MOD 30 MIN: CPT | Mod: S$PBB,,, | Performed by: NURSE PRACTITIONER

## 2019-05-10 PROCEDURE — 99999 PR PBB SHADOW E&M-EST. PATIENT-LVL V: CPT | Mod: PBBFAC,,, | Performed by: NURSE PRACTITIONER

## 2019-05-10 PROCEDURE — 36415 COLL VENOUS BLD VENIPUNCTURE: CPT | Mod: PO

## 2019-05-10 PROCEDURE — 99215 OFFICE O/P EST HI 40 MIN: CPT | Mod: PBBFAC,PO | Performed by: NURSE PRACTITIONER

## 2019-05-10 PROCEDURE — 83036 HEMOGLOBIN GLYCOSYLATED A1C: CPT

## 2019-05-10 RX ORDER — KETOCONAZOLE 20 MG/ML
1 SHAMPOO, SUSPENSION TOPICAL NIGHTLY PRN
Refills: 10 | COMMUNITY
Start: 2019-04-08

## 2019-05-10 NOTE — PROGRESS NOTES
"Subjective:       Patient ID: William Mac is a 83 y.o. male.    Chief Complaint: Parkinson's  He was last seen primary care by me on 11/14/2018.  He last saw [place] on 08/01/2018.  He is accompanied by his wife  HPI   He went to ED on 05/06/2019 after a fall onto the sidewalk. He did have a negative CT of head and neck at that time. States he leaned forward to  an item and lost balance. He also complains of voice getting low and some hoarseness that just "starts from no where and it quits just like it starts". States it is not painful but aggravation.  Vitals:    05/10/19 0957   BP: 130/60   Pulse: 60     BP Readings from Last 3 Encounters:   05/10/19 130/60   05/06/19 (!) 146/66   03/01/19 124/65     Review of Systems   HENT: Positive for voice change.        Lab Results   Component Value Date    HGBA1C 6.2 (H) 05/10/2019     CMP  Sodium   Date Value Ref Range Status   05/06/2019 137 136 - 145 mmol/L Final     Potassium   Date Value Ref Range Status   05/06/2019 4.0 3.5 - 5.1 mmol/L Final     Chloride   Date Value Ref Range Status   05/06/2019 102 95 - 110 mmol/L Final     CO2   Date Value Ref Range Status   05/06/2019 29 22 - 31 mmol/L Final     Glucose   Date Value Ref Range Status   05/06/2019 107 70 - 110 mg/dL Final     Comment:     The ADA recommends the following guidelines for fasting glucose:  Normal:       less than 100 mg/dL  Prediabetes:  100 mg/dL to 125 mg/dL  Diabetes:     126 mg/dL or higher       BUN, Bld   Date Value Ref Range Status   05/06/2019 13 9 - 21 mg/dL Final     Creatinine   Date Value Ref Range Status   05/06/2019 0.68 0.50 - 1.40 mg/dL Final     Calcium   Date Value Ref Range Status   05/06/2019 9.0 8.4 - 10.2 mg/dL Final     Total Protein   Date Value Ref Range Status   05/06/2019 6.9 6.0 - 8.4 g/dL Final     Albumin   Date Value Ref Range Status   05/06/2019 4.2 3.5 - 5.2 g/dL Final     Total Bilirubin   Date Value Ref Range Status   05/06/2019 0.6 0.2 - 1.3 mg/dL Final "     Alkaline Phosphatase   Date Value Ref Range Status   05/06/2019 56 38 - 145 U/L Final     AST ([place])   Date Value Ref Range Status   01/14/2016 29 17 - 59 U/L Final     AST   Date Value Ref Range Status   05/06/2019 22 17 - 59 U/L Final     ALT   Date Value Ref Range Status   05/06/2019 16 10 - 44 U/L Final     Anion Gap   Date Value Ref Range Status   05/06/2019 6 (L) 8 - 16 mmol/L Final     eGFR if    Date Value Ref Range Status   05/06/2019 >60 >60 mL/min/1.73 m^2 Final     eGFR if non    Date Value Ref Range Status   05/06/2019 >60 >60 mL/min/1.73 m^2 Final     Comment:     Calculation used to obtain the estimated glomerular filtration  rate (eGFR) is the CKD-EPI equation.        Objective:      Physical Exam   Constitutional: He is oriented to person, place, and time. Vital signs are normal. He appears well-developed and well-nourished. He is active and cooperative.   HENT:   Head: Normocephalic and atraumatic.   Right Ear: Hearing, tympanic membrane, external ear and ear canal normal.   Left Ear: Hearing, tympanic membrane, external ear and ear canal normal.   Nose: Nose normal.   Mouth/Throat: Uvula is midline, oropharynx is clear and moist and mucous membranes are normal.   Eyes: Lids are normal.   Neck: Trachea normal, normal range of motion, full passive range of motion without pain and phonation normal. Neck supple.   Cardiovascular: Normal rate and regular rhythm.   Pulmonary/Chest: Effort normal and breath sounds normal.   Abdominal: Soft. Bowel sounds are normal. There is no tenderness.   Musculoskeletal: Normal range of motion.   Lymphadenopathy:        Head (right side): No submental, no submandibular, no tonsillar, no preauricular, no posterior auricular and no occipital adenopathy present.        Head (left side): No submental, no submandibular, no tonsillar, no preauricular, no posterior auricular and no occipital adenopathy present.     He has no  cervical adenopathy.   Neurological: He is alert and oriented to person, place, and time. He exhibits abnormal muscle tone.   Skin: Skin is warm, dry and intact.        Numerous small skin abrasion areas. States these are being watched by his dermatologis   Psychiatric: He has a normal mood and affect. His speech is normal and behavior is normal. Judgment and thought content normal. Cognition and memory are normal.   Nursing note and vitals reviewed.      Assessment & Plan:       Fall, sequela -recovering well, no current discomfort from fall    Type II diabetes mellitus with neurological manifestations  -     Hemoglobin A1c; Future; Expected date: 05/10/2019    Parkinson's disease- Stable, has appt with Dr. Mccann (neurology)    Sensory ataxia-Stable, managed by neurology    Hypophonia with hoarseness- Discussed may need to seek our Speech and that is a manifestation from his Parkinson's    Paronychia of toenail of left foot  -     Ambulatory referral to Podiatry    Paronychia of great toe, right  -     Ambulatory referral to Podiatry      Medication List with Changes/Refills   Current Medications    ALLERGY RELIEF, LORATADINE, 10 MG TABLET    TAKE 1 TABLET BY MOUTH ONCE DAILY    ASPIRIN (ECOTRIN) 81 MG EC TABLET    Take 81 mg by mouth.    CYANOCOBALAMIN 1,000 MCG/ML INJECTION    1000mcg IM weekly x 4 weeks    CYANOCOBALAMIN 1,000 MCG/ML INJECTION    1000mcg IM once per month    CYCLOBENZAPRINE (FLEXERIL) 5 MG TABLET    TAKE ONE TABLET BY MOUTH THREE TIMES A DAY AS NEEDED FOR MUSCLE SPASMS AS DIRECTED   MAY CAUSE DROWSINESS    DICLOFENAC SODIUM (VOLTAREN) 1 % GEL    Apply 2 g topically once daily.    FLUOCINOLONE ACETONIDE OIL 0.01 % DROP    fluocinolone acetonide oil 0.01 % ear drops    HYDROCODONE-ACETAMINOPHEN (NORCO) 7.5-325 MG PER TABLET    TAKE 1 TABLET BY MOUTH ONCE A DAY AS NEEDED FOR PAIN    HYDROCODONE-ACETAMINOPHEN 5-325MG (NORCO) 5-325 MG PER TABLET    Take 1 tablet by mouth.    IRON/MULTIVITS,STRESS  FORMULA (MULTIVITAMIN, STRESS FORMULA ORAL)    Take 1 tablet by mouth once daily.      KETOCONAZOLE (NIZORAL) 2 % CREAM    APPLY TO AFFECTED AREA 1 TO 2 TIMES A DAY FOR PSORIASIS OF THE FACE    KETOCONAZOLE (NIZORAL) 2 % SHAMPOO    LATHER AND LEAVE ON 3 TO 5 MINUTES, THEN RINSE  USE FREQUENTLY  FEW TIMES A WEEK    OKAY FOR FACE    METFORMIN (GLUCOPHAGE) 1000 MG TABLET    TAKE 1 TABLET BY MOUTH TWO TIMES A DAY    MUPIROCIN (BACTROBAN) 2 % OINTMENT    Apply topically 2 (two) times daily. for 10 days    SENNA (SENOKOT) 8.6 MG TABLET    Take 2 tablets by mouth.    SERTRALINE (ZOLOFT) 50 MG TABLET    TAKE 1 TABLET BY MOUTH ONCE DAILY   Discontinued Medications    CARBIDOPA-LEVODOPA  MG (SINEMET)  MG PER TABLET    Take 1 tablet by mouth 3 (three) times daily.    PRAVASTATIN (PRAVACHOL) 40 MG TABLET    TAKE 1 TABLET BY MOUTH NIGHTLY         Follow up if symptoms worsen or fail to improve.

## 2019-05-18 RX ORDER — CARBIDOPA AND LEVODOPA 25; 100 MG/1; MG/1
TABLET ORAL
Qty: 180 TABLET | Refills: 0 | Status: SHIPPED | OUTPATIENT
Start: 2019-05-18 | End: 2019-07-12 | Stop reason: SDUPTHER

## 2019-05-21 ENCOUNTER — TELEPHONE (OUTPATIENT)
Dept: FAMILY MEDICINE | Facility: CLINIC | Age: 84
End: 2019-05-21

## 2019-05-21 NOTE — TELEPHONE ENCOUNTER
----- Message from Brittney Shahid RN sent at 5/21/2019  9:02 AM CDT -----  Please reconsider signing Home Health order 4/18/2019. Per the [facility], this patient was referred by you for Home Health on 8/18/2018.

## 2019-05-24 ENCOUNTER — OFFICE VISIT (OUTPATIENT)
Dept: PODIATRY | Facility: CLINIC | Age: 84
End: 2019-05-24
Payer: MEDICARE

## 2019-05-24 VITALS
BODY MASS INDEX: 25.86 KG/M2 | HEART RATE: 61 BPM | WEIGHT: 195.13 LBS | DIASTOLIC BLOOD PRESSURE: 56 MMHG | SYSTOLIC BLOOD PRESSURE: 123 MMHG | HEIGHT: 73 IN

## 2019-05-24 DIAGNOSIS — S90.222A SUBUNGUAL HEMATOMA OF FIFTH TOE OF LEFT FOOT, INITIAL ENCOUNTER: ICD-10-CM

## 2019-05-24 DIAGNOSIS — E11.42 DIABETIC POLYNEUROPATHY ASSOCIATED WITH TYPE 2 DIABETES MELLITUS: ICD-10-CM

## 2019-05-24 DIAGNOSIS — I73.9 PERIPHERAL VASCULAR DISEASE: ICD-10-CM

## 2019-05-24 DIAGNOSIS — L60.0 ONYCHOCRYPTOSIS: ICD-10-CM

## 2019-05-24 DIAGNOSIS — B35.1 ONYCHOMYCOSIS DUE TO DERMATOPHYTE: Primary | ICD-10-CM

## 2019-05-24 DIAGNOSIS — E11.49 TYPE II DIABETES MELLITUS WITH NEUROLOGICAL MANIFESTATIONS: ICD-10-CM

## 2019-05-24 PROCEDURE — 99213 OFFICE O/P EST LOW 20 MIN: CPT | Mod: PBBFAC,PN,25 | Performed by: PODIATRIST

## 2019-05-24 PROCEDURE — 99213 PR OFFICE/OUTPT VISIT, EST, LEVL III, 20-29 MIN: ICD-10-PCS | Mod: 25,S$PBB,, | Performed by: PODIATRIST

## 2019-05-24 PROCEDURE — 11721 DEBRIDE NAIL 6 OR MORE: CPT | Mod: S$PBB,Q9,, | Performed by: PODIATRIST

## 2019-05-24 PROCEDURE — 11721 PR DEBRIDEMENT OF NAILS, 6 OR MORE: ICD-10-PCS | Mod: S$PBB,Q9,, | Performed by: PODIATRIST

## 2019-05-24 PROCEDURE — 99999 PR PBB SHADOW E&M-EST. PATIENT-LVL III: CPT | Mod: PBBFAC,,, | Performed by: PODIATRIST

## 2019-05-24 PROCEDURE — 99213 OFFICE O/P EST LOW 20 MIN: CPT | Mod: 25,S$PBB,, | Performed by: PODIATRIST

## 2019-05-24 PROCEDURE — 99999 PR PBB SHADOW E&M-EST. PATIENT-LVL III: ICD-10-PCS | Mod: PBBFAC,,, | Performed by: PODIATRIST

## 2019-05-24 PROCEDURE — 11721 DEBRIDE NAIL 6 OR MORE: CPT | Mod: PBBFAC,PN | Performed by: PODIATRIST

## 2019-05-24 NOTE — PATIENT INSTRUCTIONS
- Check feet daily for wounds and areas of irritation.    - Keep regularly scheduled appointments with primary care, ophthalmology, and podiatry.    - Maintain blood glucose control via taking medications as prescribed, diabetic diet, and exercise.    - Apply lotion to feet and ankles daily but not between toes.    - Keep feet clean and dry, especially between toes.    - Elevate feet as much as possible throughout the day.    - Wear supportive/accommodative shoes at all times when ambulating.    - Supplement with alpha lipoic acid and vitamin B complex to reduce and repair nerve damage.    - Notify clinic immediately of any new or worsening conditions.    - Follow up in 9 weeks for nail care.
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
## 2019-07-05 DIAGNOSIS — F32.A DEPRESSION, UNSPECIFIED DEPRESSION TYPE: ICD-10-CM

## 2019-07-08 RX ORDER — SERTRALINE HYDROCHLORIDE 50 MG/1
TABLET, FILM COATED ORAL
Qty: 90 TABLET | Refills: 1 | Status: SHIPPED | OUTPATIENT
Start: 2019-07-08 | End: 2020-01-01

## 2019-07-12 DIAGNOSIS — G20.A1 PARKINSON'S DISEASE: Primary | ICD-10-CM

## 2019-07-12 RX ORDER — CARBIDOPA AND LEVODOPA 25; 100 MG/1; MG/1
TABLET ORAL
Qty: 180 TABLET | Refills: 0 | Status: SHIPPED | OUTPATIENT
Start: 2019-07-12 | End: 2019-07-12 | Stop reason: SDUPTHER

## 2019-07-12 RX ORDER — CARBIDOPA AND LEVODOPA 25; 100 MG/1; MG/1
2 TABLET ORAL 3 TIMES DAILY
Qty: 180 TABLET | Refills: 0 | Status: SHIPPED | OUTPATIENT
Start: 2019-07-12 | End: 2019-08-08 | Stop reason: SDUPTHER

## 2019-07-12 NOTE — TELEPHONE ENCOUNTER
----- Message from Breanna Holbrook sent at 7/12/2019  9:23 AM CDT -----  Contact: aundrea lau (daughter)  Type: Needs Medical Advice    Who Called: aundrea lau (daughter)  Best Call Back Number: [phone]  Additional Information: would like for the nurse to give her a call back in regards to the patient's medication. Please give call back

## 2019-07-12 NOTE — TELEPHONE ENCOUNTER
Returned call and spoke with patient's daughter, Lorie. Patient is taking his medication 2 times a day not 3 times a day because he has extra tablets. Lorie thinks it is because sometimes the patient wakes up late so he misses the morning dose. Lorie would like to add in the directions for the patient to take the first dose 30 minutes before he gets out of bed that way he can take it 3 times a day. Please advise.

## 2019-07-23 ENCOUNTER — PATIENT OUTREACH (OUTPATIENT)
Dept: ADMINISTRATIVE | Facility: HOSPITAL | Age: 84
End: 2019-07-23

## 2019-08-02 ENCOUNTER — OFFICE VISIT (OUTPATIENT)
Dept: PODIATRY | Facility: CLINIC | Age: 84
End: 2019-08-02
Payer: MEDICARE

## 2019-08-02 VITALS
HEIGHT: 73 IN | HEART RATE: 62 BPM | WEIGHT: 195 LBS | SYSTOLIC BLOOD PRESSURE: 117 MMHG | BODY MASS INDEX: 25.84 KG/M2 | DIASTOLIC BLOOD PRESSURE: 65 MMHG

## 2019-08-02 DIAGNOSIS — I73.9 PERIPHERAL VASCULAR DISEASE: ICD-10-CM

## 2019-08-02 DIAGNOSIS — E11.42 DIABETIC POLYNEUROPATHY ASSOCIATED WITH TYPE 2 DIABETES MELLITUS: ICD-10-CM

## 2019-08-02 DIAGNOSIS — E11.49 TYPE II DIABETES MELLITUS WITH NEUROLOGICAL MANIFESTATIONS: ICD-10-CM

## 2019-08-02 DIAGNOSIS — B35.1 ONYCHOMYCOSIS DUE TO DERMATOPHYTE: Primary | ICD-10-CM

## 2019-08-02 PROCEDURE — 99999 PR PBB SHADOW E&M-EST. PATIENT-LVL III: CPT | Mod: PBBFAC,,, | Performed by: PODIATRIST

## 2019-08-02 PROCEDURE — 11721 PR DEBRIDEMENT OF NAILS, 6 OR MORE: ICD-10-PCS | Mod: S$PBB,Q9,, | Performed by: PODIATRIST

## 2019-08-02 PROCEDURE — 99499 UNLISTED E&M SERVICE: CPT | Mod: S$PBB,,, | Performed by: PODIATRIST

## 2019-08-02 PROCEDURE — 11721 DEBRIDE NAIL 6 OR MORE: CPT | Mod: PBBFAC,PN | Performed by: PODIATRIST

## 2019-08-02 PROCEDURE — 11721 DEBRIDE NAIL 6 OR MORE: CPT | Mod: S$PBB,Q9,, | Performed by: PODIATRIST

## 2019-08-02 PROCEDURE — 99999 PR PBB SHADOW E&M-EST. PATIENT-LVL III: ICD-10-PCS | Mod: PBBFAC,,, | Performed by: PODIATRIST

## 2019-08-02 PROCEDURE — 99499 NO LOS: ICD-10-PCS | Mod: S$PBB,,, | Performed by: PODIATRIST

## 2019-08-02 PROCEDURE — 99213 OFFICE O/P EST LOW 20 MIN: CPT | Mod: PBBFAC,PN | Performed by: PODIATRIST

## 2019-08-02 NOTE — PATIENT INSTRUCTIONS
- Check feet daily for wounds and areas of irritation.    - Keep regularly scheduled appointments with primary care, ophthalmology, and podiatry.    - Maintain blood glucose control via taking medications as prescribed, diabetic diet, and exercise.    - Apply lotion to feet and ankles daily but not between toes.    - Keep feet clean and dry, especially between toes.    - Elevate feet as much as possible throughout the day.    - Wear supportive/accommodative shoes at all times when ambulating.    - Supplement with alpha lipoic acid and vitamin B complex to reduce and repair nerve damage.    - Notify clinic immediately of any new or worsening conditions.

## 2019-08-06 ENCOUNTER — OFFICE VISIT (OUTPATIENT)
Dept: FAMILY MEDICINE | Facility: CLINIC | Age: 84
End: 2019-08-06
Payer: MEDICARE

## 2019-08-06 VITALS
SYSTOLIC BLOOD PRESSURE: 108 MMHG | WEIGHT: 193.13 LBS | DIASTOLIC BLOOD PRESSURE: 56 MMHG | BODY MASS INDEX: 25.6 KG/M2 | OXYGEN SATURATION: 94 % | HEART RATE: 59 BPM | HEIGHT: 73 IN

## 2019-08-06 DIAGNOSIS — E11.49 TYPE II DIABETES MELLITUS WITH NEUROLOGICAL MANIFESTATIONS: ICD-10-CM

## 2019-08-06 DIAGNOSIS — I10 ESSENTIAL HYPERTENSION: Primary | ICD-10-CM

## 2019-08-06 DIAGNOSIS — E78.5 HYPERLIPIDEMIA, UNSPECIFIED HYPERLIPIDEMIA TYPE: ICD-10-CM

## 2019-08-06 PROCEDURE — 99214 OFFICE O/P EST MOD 30 MIN: CPT | Mod: S$PBB,,, | Performed by: FAMILY MEDICINE

## 2019-08-06 PROCEDURE — 99999 PR PBB SHADOW E&M-EST. PATIENT-LVL III: CPT | Mod: PBBFAC,,, | Performed by: FAMILY MEDICINE

## 2019-08-06 PROCEDURE — 99214 PR OFFICE/OUTPT VISIT, EST, LEVL IV, 30-39 MIN: ICD-10-PCS | Mod: S$PBB,,, | Performed by: FAMILY MEDICINE

## 2019-08-06 PROCEDURE — 99213 OFFICE O/P EST LOW 20 MIN: CPT | Mod: PBBFAC,PO | Performed by: FAMILY MEDICINE

## 2019-08-06 PROCEDURE — 99999 PR PBB SHADOW E&M-EST. PATIENT-LVL III: ICD-10-PCS | Mod: PBBFAC,,, | Performed by: FAMILY MEDICINE

## 2019-08-06 RX ORDER — METFORMIN HYDROCHLORIDE 1000 MG/1
TABLET ORAL
Qty: 180 TABLET | Refills: 0 | Status: SHIPPED | OUTPATIENT
Start: 2019-08-06 | End: 2019-10-01

## 2019-08-06 NOTE — PROGRESS NOTES
Subjective:       Patient ID: William Mac is a 83 y.o. male.    Chief Complaint: Follow-up and Stye (right eye)    Here for f/u htn and dm II. Here with his wife today. Doing well overall.    Hypertension   This is a chronic problem. The current episode started more than 1 year ago. The problem is controlled. Pertinent negatives include no chest pain, palpitations or shortness of breath.   Diabetes   He presents for his follow-up diabetic visit. He has type 2 diabetes mellitus. His disease course has been stable. Pertinent negatives for hypoglycemia include no nervousness/anxiousness. Pertinent negatives for diabetes include no chest pain and no fatigue.     Review of Systems   Constitutional: Negative for chills, fatigue and fever.   Respiratory: Negative for cough, chest tightness and shortness of breath.    Cardiovascular: Negative for chest pain, palpitations and leg swelling.   Endocrine: Negative for cold intolerance and heat intolerance.   Skin: Negative for rash.   Psychiatric/Behavioral: Negative for dysphoric mood. The patient is not nervous/anxious.        Objective:      Physical Exam   Constitutional: He appears well-developed and well-nourished.   HENT:   Head: Normocephalic and atraumatic.   Cardiovascular: Normal rate, regular rhythm and normal heart sounds.   Pulmonary/Chest: Effort normal and breath sounds normal.   Psychiatric: He has a normal mood and affect.   Nursing note and vitals reviewed.      Assessment:       1. Essential hypertension    2. Hyperlipidemia, unspecified hyperlipidemia type    3. Type II diabetes mellitus with neurological manifestations        Plan:       Essential hypertension  -     CBC auto differential; Future; Expected date: 08/06/2019  -     Comprehensive metabolic panel; Future; Expected date: 08/06/2019  -     Hemoglobin A1c; Future; Expected date: 08/06/2019  -     Lipid panel; Future; Expected date: 08/06/2019  -     Microalbumin/creatinine urine ratio; Future;  Expected date: 08/06/2019  -     TSH; Future; Expected date: 08/06/2019    Hyperlipidemia, unspecified hyperlipidemia type  -     CBC auto differential; Future; Expected date: 08/06/2019  -     Comprehensive metabolic panel; Future; Expected date: 08/06/2019  -     Hemoglobin A1c; Future; Expected date: 08/06/2019  -     Lipid panel; Future; Expected date: 08/06/2019  -     Microalbumin/creatinine urine ratio; Future; Expected date: 08/06/2019  -     TSH; Future; Expected date: 08/06/2019    Type II diabetes mellitus with neurological manifestations  -     CBC auto differential; Future; Expected date: 08/06/2019  -     Comprehensive metabolic panel; Future; Expected date: 08/06/2019  -     Hemoglobin A1c; Future; Expected date: 08/06/2019  -     Lipid panel; Future; Expected date: 08/06/2019  -     Microalbumin/creatinine urine ratio; Future; Expected date: 08/06/2019  -     TSH; Future; Expected date: 08/06/2019      Update labs and health maintenance.  Dm II stable  htn stable  Lipid stable  Will monitor chronic medical issues and continue current plan of care.      Follow up in about 6 months (around 2/6/2020), or if symptoms worsen or fail to improve.

## 2019-08-07 ENCOUNTER — TELEPHONE (OUTPATIENT)
Dept: NEUROLOGY | Facility: CLINIC | Age: 84
End: 2019-08-07

## 2019-08-07 NOTE — TELEPHONE ENCOUNTER
Called and left a message for AdinaBubba regarding his appt with  on tomorrow in [place]. Called to comfirm if patient will make his appt

## 2019-08-08 ENCOUNTER — OFFICE VISIT (OUTPATIENT)
Dept: NEUROLOGY | Facility: CLINIC | Age: 84
End: 2019-08-08
Payer: MEDICARE

## 2019-08-08 VITALS
SYSTOLIC BLOOD PRESSURE: 112 MMHG | WEIGHT: 192.25 LBS | BODY MASS INDEX: 25.48 KG/M2 | HEIGHT: 73 IN | HEART RATE: 65 BPM | DIASTOLIC BLOOD PRESSURE: 60 MMHG

## 2019-08-08 DIAGNOSIS — E11.42 DIABETIC POLYNEUROPATHY ASSOCIATED WITH TYPE 2 DIABETES MELLITUS: ICD-10-CM

## 2019-08-08 DIAGNOSIS — F03.90 DEMENTIA WITHOUT BEHAVIORAL DISTURBANCE, UNSPECIFIED DEMENTIA TYPE: ICD-10-CM

## 2019-08-08 DIAGNOSIS — G20.A1 PARKINSON'S DISEASE: Primary | ICD-10-CM

## 2019-08-08 DIAGNOSIS — G93.89 CEREBRAL VENTRICULOMEGALY: ICD-10-CM

## 2019-08-08 DIAGNOSIS — Z96.89 STATUS POST INSERTION OF SPINAL CORD STIMULATOR: ICD-10-CM

## 2019-08-08 DIAGNOSIS — E53.8 B12 DEFICIENCY: ICD-10-CM

## 2019-08-08 PROCEDURE — 99214 PR OFFICE/OUTPT VISIT, EST, LEVL IV, 30-39 MIN: ICD-10-PCS | Mod: S$PBB,,, | Performed by: PSYCHIATRY & NEUROLOGY

## 2019-08-08 PROCEDURE — 99213 OFFICE O/P EST LOW 20 MIN: CPT | Mod: PBBFAC,PO | Performed by: PSYCHIATRY & NEUROLOGY

## 2019-08-08 PROCEDURE — 99214 OFFICE O/P EST MOD 30 MIN: CPT | Mod: S$PBB,,, | Performed by: PSYCHIATRY & NEUROLOGY

## 2019-08-08 PROCEDURE — 99999 PR PBB SHADOW E&M-EST. PATIENT-LVL III: CPT | Mod: PBBFAC,,, | Performed by: PSYCHIATRY & NEUROLOGY

## 2019-08-08 PROCEDURE — 99999 PR PBB SHADOW E&M-EST. PATIENT-LVL III: ICD-10-PCS | Mod: PBBFAC,,, | Performed by: PSYCHIATRY & NEUROLOGY

## 2019-08-08 RX ORDER — CARBIDOPA AND LEVODOPA 25; 100 MG/1; MG/1
2 TABLET ORAL 3 TIMES DAILY
Qty: 180 TABLET | Refills: 0 | Status: SHIPPED | OUTPATIENT
Start: 2019-08-08 | End: 2019-01-01 | Stop reason: SDUPTHER

## 2019-08-08 NOTE — LETTER
August 8, 2019      Yaneth Gomez MD  [address] [facility] [address]  [address]  [facility] [address]           [facility]  [address] [facility] [address] [place] [address]  Phone: [phone]  Fax: [phone]          Patient: William Mac   MR Number: [id number]   YOB: 1935   Date of Visit: 8/8/2019       Dear Dr. Yaneth Gomez:    Thank you for referring William Mac to me for evaluation. Attached you will find relevant portions of my assessment and plan of care.    If you have questions, please do not hesitate to call me. I look forward to following William Mac along with you.    Sincerely,    Lindsay Mccann MD    Enclosure  CC:  No Recipients    If you would like to receive this communication electronically, please contact [email or web address] or [phone] to request more information on [employer] Link access.    For providers and/or their staff who would like to refer a patient to [facility], please contact us through our one-stop-shop provider referral line, [facility], at [phone].    If you feel you have received this communication in error or would no longer like to receive these types of communications, please e-mail [email or web address]

## 2019-08-08 NOTE — ASSESSMENT & PLAN NOTE
Significant atrophy with compensatory ventricular dilation.  Also has significant white matter disease which can directly cause a frontal gait disorder.
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
## 2019-08-08 NOTE — ASSESSMENT & PLAN NOTE
I have high suspicion that he has secondary (vascular PD or secondary to dementia) and not idiopathic given the significant co-morbidities and the CT scan findings.  Regardless, though, he is responding to levodopa and doing well on current dosing.   -> PT   -> continue sinemet 25/100, 2 pills tid
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
## 2019-08-12 PROBLEM — B35.1 ONYCHOMYCOSIS DUE TO DERMATOPHYTE: Status: ACTIVE | Noted: 2019-08-12

## 2019-08-13 ENCOUNTER — TELEPHONE (OUTPATIENT)
Dept: NEUROLOGY | Facility: CLINIC | Age: 84
End: 2019-08-13

## 2019-08-13 NOTE — TELEPHONE ENCOUNTER
----- Message from Eddie Castillo sent at 8/13/2019 11:34 AM CDT -----  Contact: [facility] @ [phone]  Caller calling to get the order for physical therapy fax to [facility] in [place] @ [phone]

## 2019-09-13 ENCOUNTER — PES CALL (OUTPATIENT)
Dept: ADMINISTRATIVE | Facility: CLINIC | Age: 84
End: 2019-09-13

## 2019-09-23 ENCOUNTER — TELEPHONE (OUTPATIENT)
Dept: FAMILY MEDICINE | Facility: CLINIC | Age: 84
End: 2019-09-23

## 2019-09-23 NOTE — TELEPHONE ENCOUNTER
----- Message from Vivienne Blankenship sent at 9/23/2019  2:19 PM CDT -----  Contact: Cailin wife  Type:  Sooner Apoointment Request    Caller is requesting a sooner appointment.  Caller declined first available appointment listed below.  Caller will not accept being placed on the waitlist and is requesting a message be sent to doctor.    Name of Caller:  Cailin  When is the first available appointment?  10/08/19  Symptoms:  very fatigued, not feeling well, weak/phys therapist told them to make appt to see dr/pt couldn't finish p/t  Best Call Back Number:  [phone]  Additional Information:  Pls call Cailin regarding a sooner appt

## 2019-09-24 ENCOUNTER — LAB VISIT (OUTPATIENT)
Dept: LAB | Facility: HOSPITAL | Age: 84
End: 2019-09-24
Attending: PHYSICIAN ASSISTANT
Payer: MEDICARE

## 2019-09-24 ENCOUNTER — OFFICE VISIT (OUTPATIENT)
Dept: FAMILY MEDICINE | Facility: CLINIC | Age: 84
End: 2019-09-24
Payer: MEDICARE

## 2019-09-24 VITALS
RESPIRATION RATE: 18 BRPM | WEIGHT: 192 LBS | HEART RATE: 60 BPM | TEMPERATURE: 98 F | BODY MASS INDEX: 25.45 KG/M2 | OXYGEN SATURATION: 96 % | HEIGHT: 73 IN | SYSTOLIC BLOOD PRESSURE: 96 MMHG | DIASTOLIC BLOOD PRESSURE: 60 MMHG

## 2019-09-24 DIAGNOSIS — G20.A1 PARKINSON'S DISEASE: ICD-10-CM

## 2019-09-24 DIAGNOSIS — E11.49 TYPE II DIABETES MELLITUS WITH NEUROLOGICAL MANIFESTATIONS: ICD-10-CM

## 2019-09-24 DIAGNOSIS — I10 ESSENTIAL HYPERTENSION: ICD-10-CM

## 2019-09-24 DIAGNOSIS — R53.83 FATIGUE, UNSPECIFIED TYPE: Primary | ICD-10-CM

## 2019-09-24 DIAGNOSIS — R53.83 FATIGUE, UNSPECIFIED TYPE: ICD-10-CM

## 2019-09-24 LAB
ALBUMIN SERPL BCP-MCNC: 3.9 G/DL (ref 3.5–5.2)
ALP SERPL-CCNC: 61 U/L (ref 55–135)
ALT SERPL W/O P-5'-P-CCNC: <5 U/L (ref 10–44)
ANION GAP SERPL CALC-SCNC: 8 MMOL/L (ref 8–16)
AST SERPL-CCNC: 14 U/L (ref 10–40)
BASOPHILS # BLD AUTO: 0.04 K/UL (ref 0–0.2)
BASOPHILS NFR BLD: 0.6 % (ref 0–1.9)
BILIRUB SERPL-MCNC: 0.5 MG/DL (ref 0.1–1)
BUN SERPL-MCNC: 11 MG/DL (ref 8–23)
CALCIUM SERPL-MCNC: 9.1 MG/DL (ref 8.7–10.5)
CHLORIDE SERPL-SCNC: 106 MMOL/L (ref 95–110)
CO2 SERPL-SCNC: 27 MMOL/L (ref 23–29)
CREAT SERPL-MCNC: 0.9 MG/DL (ref 0.5–1.4)
DIFFERENTIAL METHOD: ABNORMAL
EOSINOPHIL # BLD AUTO: 0.3 K/UL (ref 0–0.5)
EOSINOPHIL NFR BLD: 4.9 % (ref 0–8)
ERYTHROCYTE [DISTWIDTH] IN BLOOD BY AUTOMATED COUNT: 13.1 % (ref 11.5–14.5)
EST. GFR  (AFRICAN AMERICAN): >60 ML/MIN/1.73 M^2
EST. GFR  (NON AFRICAN AMERICAN): >60 ML/MIN/1.73 M^2
ESTIMATED AVG GLUCOSE: 123 MG/DL (ref 68–131)
GLUCOSE SERPL-MCNC: 159 MG/DL (ref 70–110)
HBA1C MFR BLD HPLC: 5.9 % (ref 4–5.6)
HCT VFR BLD AUTO: 45.1 % (ref 40–54)
HGB BLD-MCNC: 14.3 G/DL (ref 14–18)
IMM GRANULOCYTES # BLD AUTO: 0.01 K/UL (ref 0–0.04)
IMM GRANULOCYTES NFR BLD AUTO: 0.2 % (ref 0–0.5)
LYMPHOCYTES # BLD AUTO: 1.8 K/UL (ref 1–4.8)
LYMPHOCYTES NFR BLD: 27.2 % (ref 18–48)
MCH RBC QN AUTO: 31.4 PG (ref 27–31)
MCHC RBC AUTO-ENTMCNC: 31.7 G/DL (ref 32–36)
MCV RBC AUTO: 99 FL (ref 82–98)
MONOCYTES # BLD AUTO: 0.6 K/UL (ref 0.3–1)
MONOCYTES NFR BLD: 8.5 % (ref 4–15)
NEUTROPHILS # BLD AUTO: 3.9 K/UL (ref 1.8–7.7)
NEUTROPHILS NFR BLD: 58.6 % (ref 38–73)
NRBC BLD-RTO: 0 /100 WBC
PLATELET # BLD AUTO: 123 K/UL (ref 150–350)
PMV BLD AUTO: 9.1 FL (ref 9.2–12.9)
POTASSIUM SERPL-SCNC: 4.3 MMOL/L (ref 3.5–5.1)
PROT SERPL-MCNC: 6.9 G/DL (ref 6–8.4)
RBC # BLD AUTO: 4.56 M/UL (ref 4.6–6.2)
SODIUM SERPL-SCNC: 141 MMOL/L (ref 136–145)
WBC # BLD AUTO: 6.57 K/UL (ref 3.9–12.7)

## 2019-09-24 PROCEDURE — 99214 PR OFFICE/OUTPT VISIT, EST, LEVL IV, 30-39 MIN: ICD-10-PCS | Mod: S$PBB,,, | Performed by: PHYSICIAN ASSISTANT

## 2019-09-24 PROCEDURE — 99999 PR PBB SHADOW E&M-EST. PATIENT-LVL III: CPT | Mod: PBBFAC,,, | Performed by: PHYSICIAN ASSISTANT

## 2019-09-24 PROCEDURE — 80053 COMPREHEN METABOLIC PANEL: CPT

## 2019-09-24 PROCEDURE — 90662 IIV NO PRSV INCREASED AG IM: CPT | Mod: PBBFAC,PO

## 2019-09-24 PROCEDURE — 83036 HEMOGLOBIN GLYCOSYLATED A1C: CPT

## 2019-09-24 PROCEDURE — 99213 OFFICE O/P EST LOW 20 MIN: CPT | Mod: PBBFAC,PO | Performed by: PHYSICIAN ASSISTANT

## 2019-09-24 PROCEDURE — 36415 COLL VENOUS BLD VENIPUNCTURE: CPT | Mod: PO

## 2019-09-24 PROCEDURE — 99214 OFFICE O/P EST MOD 30 MIN: CPT | Mod: S$PBB,,, | Performed by: PHYSICIAN ASSISTANT

## 2019-09-24 PROCEDURE — 99999 PR PBB SHADOW E&M-EST. PATIENT-LVL III: ICD-10-PCS | Mod: PBBFAC,,, | Performed by: PHYSICIAN ASSISTANT

## 2019-09-24 PROCEDURE — 85025 COMPLETE CBC W/AUTO DIFF WBC: CPT

## 2019-09-24 NOTE — Clinical Note
Would you be comfortable moving down his dose of Metformin to 500mg daily?  He is having a decreased appetite and not eating regularly.  Thanks, Shaye Prado PA-C

## 2019-09-24 NOTE — PATIENT INSTRUCTIONS
Try to eat and drink regularly.    Bloodwork today.  Will call with results.    Thanks for seeing me,  Shaye Prado PA-C

## 2019-09-24 NOTE — PROGRESS NOTES
"Subjective:      Patient ID: William Mac is a 84 y.o. male.    Chief Complaint: Fatigue and Shortness of Breath  Patient is new to me.    HPI   Patient went to physical therapy yesterday and felt tired.  PT recommended him come in.    Left hip pain since fall two weeks ago.  No groin pain with left hip.  Pain to lateral left hip.    BP Readings from Last 3 Encounters:   09/24/19 96/60   08/08/19 112/60   08/06/19 (!) 108/56     Review of Systems   Constitutional: Negative for chills and fever.   Respiratory: Negative for shortness of breath.    Cardiovascular: Negative for chest pain.   Gastrointestinal: Positive for constipation (intermittent). Negative for abdominal pain, diarrhea, nausea and vomiting.   Genitourinary: Negative for dysuria.   Musculoskeletal:        Left hip pain.   Neurological: Positive for syncope (when he falls), weakness (legs) and headaches (rarely). Negative for dizziness and light-headedness.       Objective:   BP 96/60   Pulse 60   Temp 98 °F (36.7 °C)   Resp 18   Ht 6' 1" (1.854 m)   Wt 87.1 kg (192 lb 0.3 oz)   SpO2 96%   BMI 25.33 kg/m²      Physical Exam   Constitutional: He is oriented to person, place, and time. Vital signs are normal. He appears well-developed and well-nourished. He is active and cooperative. No distress.   Patient is in wheelchair currently.   HENT:   Head: Normocephalic and atraumatic.   Right Ear: Hearing, tympanic membrane, external ear and ear canal normal.   Left Ear: Hearing, tympanic membrane, external ear and ear canal normal.   Nose: Nose normal.   Mouth/Throat: Oropharynx is clear and moist. No oropharyngeal exudate.   Eyes: Conjunctivae and lids are normal.   Neck: Normal range of motion and phonation normal. Neck supple.   Cardiovascular: Normal rate, regular rhythm and normal heart sounds. Exam reveals no gallop and no friction rub.   No murmur heard.  Pulmonary/Chest: Effort normal and breath sounds normal. No stridor. No respiratory " distress. He has no decreased breath sounds. He has no wheezes. He has no rhonchi. He has no rales.   Abdominal: Soft. Bowel sounds are normal. There is no tenderness.   Musculoskeletal: Normal range of motion.   Neurological: He is alert and oriented to person, place, and time.   Due to patient's neurological symptoms, he could not follow directions for neuro exam, so symmetrical strength could not be tested.   Skin: Skin is warm, dry and intact. No rash noted.   Psychiatric: His behavior is normal. Thought content normal. His affect is blunt. His speech is delayed.   Vitals reviewed.     Assessment:      1. Fatigue, unspecified type    2. Type II diabetes mellitus with neurological manifestations    3. Essential hypertension    4. Parkinson's disease       Plan:   1. Fatigue, unspecified type  Try to eat and drink regularly.  Bloodwork today.  Will call with results.  - CBC auto differential; Future  - Comprehensive metabolic panel; Future  - Hemoglobin A1c; Future    2. Type II diabetes mellitus with neurological manifestations  Will discuss whether we can half the dose of metformin with Dr. Wen once bloodwork is resulted.  - Hemoglobin A1c; Future    3. Parkinson's disease  Discussed that the fatigue could be from not eating regularly, but also from the depression associated with Parkinson's Disease.    4. Essential hypertension  Controlled without medication.    Patient had a high dose flu shot today.  Will continue to monitor hip pain and investigate if it continues to bother him.  Follow up in two months.  Patient agreed with plan and expressed understanding.

## 2019-10-01 DIAGNOSIS — E11.49 TYPE II DIABETES MELLITUS WITH NEUROLOGICAL MANIFESTATIONS: Primary | ICD-10-CM

## 2019-10-01 RX ORDER — METFORMIN HYDROCHLORIDE 1000 MG/1
500 TABLET ORAL 2 TIMES DAILY
Qty: 180 TABLET | Refills: 3 | Status: SHIPPED | OUTPATIENT
Start: 2019-10-01 | End: 2020-01-01 | Stop reason: CLARIF

## 2019-10-01 NOTE — PROGRESS NOTES
Electrolytes, kidney function, liver function, and complete blood count are stable with insignificant abnormalities.  Hemoglobin A1C is well-controlled.  Talked with Dr. Wen, and I recommend to half the dose of metformin and then redo Hemoglobin A1C in three months.    Thanks,  Shaye Prado PA-C

## 2019-10-07 ENCOUNTER — OFFICE VISIT (OUTPATIENT)
Dept: FAMILY MEDICINE | Facility: CLINIC | Age: 84
End: 2019-10-07
Payer: MEDICARE

## 2019-10-07 ENCOUNTER — OFFICE VISIT (OUTPATIENT)
Dept: PODIATRY | Facility: CLINIC | Age: 84
End: 2019-10-07
Payer: MEDICARE

## 2019-10-07 VITALS
BODY MASS INDEX: 26.11 KG/M2 | HEART RATE: 63 BPM | WEIGHT: 197 LBS | HEIGHT: 73 IN | DIASTOLIC BLOOD PRESSURE: 66 MMHG | SYSTOLIC BLOOD PRESSURE: 98 MMHG

## 2019-10-07 VITALS
BODY MASS INDEX: 25.45 KG/M2 | DIASTOLIC BLOOD PRESSURE: 68 MMHG | HEART RATE: 66 BPM | SYSTOLIC BLOOD PRESSURE: 120 MMHG | WEIGHT: 192 LBS | HEIGHT: 73 IN | OXYGEN SATURATION: 97 %

## 2019-10-07 DIAGNOSIS — E11.49 TYPE II DIABETES MELLITUS WITH NEUROLOGICAL MANIFESTATIONS: ICD-10-CM

## 2019-10-07 DIAGNOSIS — E11.42 DIABETIC POLYNEUROPATHY ASSOCIATED WITH TYPE 2 DIABETES MELLITUS: ICD-10-CM

## 2019-10-07 DIAGNOSIS — E78.1 HYPERTRIGLYCERIDEMIA: ICD-10-CM

## 2019-10-07 DIAGNOSIS — D69.6 THROMBOCYTOPENIA: ICD-10-CM

## 2019-10-07 DIAGNOSIS — F03.90 DEMENTIA WITHOUT BEHAVIORAL DISTURBANCE, UNSPECIFIED DEMENTIA TYPE: ICD-10-CM

## 2019-10-07 DIAGNOSIS — I10 ESSENTIAL HYPERTENSION: ICD-10-CM

## 2019-10-07 DIAGNOSIS — E11.610 DIABETIC NEUROGENIC ARTHROPATHY: ICD-10-CM

## 2019-10-07 DIAGNOSIS — B35.1 ONYCHOMYCOSIS DUE TO DERMATOPHYTE: Primary | ICD-10-CM

## 2019-10-07 DIAGNOSIS — B35.1 ONYCHOMYCOSIS OF MULTIPLE TOENAILS WITH TYPE 2 DIABETES MELLITUS: ICD-10-CM

## 2019-10-07 DIAGNOSIS — I73.9 PERIPHERAL VASCULAR DISEASE: ICD-10-CM

## 2019-10-07 DIAGNOSIS — I25.10 CORONARY ARTERY DISEASE, ANGINA PRESENCE UNSPECIFIED, UNSPECIFIED VESSEL OR LESION TYPE, UNSPECIFIED WHETHER NATIVE OR TRANSPLANTED HEART: ICD-10-CM

## 2019-10-07 DIAGNOSIS — Z00.00 ENCOUNTER FOR PREVENTIVE HEALTH EXAMINATION: Primary | ICD-10-CM

## 2019-10-07 DIAGNOSIS — E11.65 UNCONTROLLED TYPE 2 DIABETES MELLITUS WITH HYPERGLYCEMIA: ICD-10-CM

## 2019-10-07 DIAGNOSIS — E78.5 HYPERLIPIDEMIA, UNSPECIFIED HYPERLIPIDEMIA TYPE: ICD-10-CM

## 2019-10-07 DIAGNOSIS — G20.A1 PARKINSON'S DISEASE: ICD-10-CM

## 2019-10-07 DIAGNOSIS — F33.1 MODERATE EPISODE OF RECURRENT MAJOR DEPRESSIVE DISORDER: ICD-10-CM

## 2019-10-07 DIAGNOSIS — E11.69 ONYCHOMYCOSIS OF MULTIPLE TOENAILS WITH TYPE 2 DIABETES MELLITUS: ICD-10-CM

## 2019-10-07 PROCEDURE — 11721 DEBRIDE NAIL 6 OR MORE: CPT | Mod: Q9,S$PBB,, | Performed by: PODIATRIST

## 2019-10-07 PROCEDURE — 11721 PR DEBRIDEMENT OF NAILS, 6 OR MORE: ICD-10-PCS | Mod: Q9,S$PBB,, | Performed by: PODIATRIST

## 2019-10-07 PROCEDURE — G0439 PPPS, SUBSEQ VISIT: HCPCS | Mod: S$GLB,,, | Performed by: NURSE PRACTITIONER

## 2019-10-07 PROCEDURE — 99999 PR PBB SHADOW E&M-EST. PATIENT-LVL III: CPT | Mod: PBBFAC,,, | Performed by: PODIATRIST

## 2019-10-07 PROCEDURE — 99499 UNLISTED E&M SERVICE: CPT | Mod: S$PBB,,, | Performed by: PODIATRIST

## 2019-10-07 PROCEDURE — G0439 PR MEDICARE ANNUAL WELLNESS SUBSEQUENT VISIT: ICD-10-PCS | Mod: S$GLB,,, | Performed by: NURSE PRACTITIONER

## 2019-10-07 PROCEDURE — 99999 PR PBB SHADOW E&M-EST. PATIENT-LVL III: ICD-10-PCS | Mod: PBBFAC,,, | Performed by: PODIATRIST

## 2019-10-07 PROCEDURE — 99999 PR PBB SHADOW E&M-EST. PATIENT-LVL V: ICD-10-PCS | Mod: PBBFAC,,, | Performed by: NURSE PRACTITIONER

## 2019-10-07 PROCEDURE — 11721 DEBRIDE NAIL 6 OR MORE: CPT | Mod: PBBFAC,PN | Performed by: PODIATRIST

## 2019-10-07 PROCEDURE — 99499 NO LOS: ICD-10-PCS | Mod: S$PBB,,, | Performed by: PODIATRIST

## 2019-10-07 PROCEDURE — 99215 OFFICE O/P EST HI 40 MIN: CPT | Mod: PBBFAC,PO | Performed by: NURSE PRACTITIONER

## 2019-10-07 PROCEDURE — 99999 PR PBB SHADOW E&M-EST. PATIENT-LVL V: CPT | Mod: PBBFAC,,, | Performed by: NURSE PRACTITIONER

## 2019-10-07 PROCEDURE — 99213 OFFICE O/P EST LOW 20 MIN: CPT | Mod: PBBFAC,27,PN,25 | Performed by: PODIATRIST

## 2019-10-07 NOTE — PATIENT INSTRUCTIONS
Counseling and Referral of Other Preventative  (Italic type indicates deductible and co-insurance are waived)    Patient Name: William Mac  Today's Date: 10/7/2019    Health Maintenance       Date Due Completion Date    Shingles Vaccine (1 of 2) 08/26/1986 ---    Lipid Panel 09/21/2019 9/21/2018    Urine Microalbumin 09/21/2019 9/21/2018    Eye Exam 10/22/2019 10/22/2018    Override on 4/14/2014: (N/S) (sees Dr. Reina)    Override on 1/29/2013: (N/S) (is UTD with Dr. Ruiz)    Hemoglobin A1c 03/24/2020 9/24/2019    Override on 1/29/2013: (N/S)    Foot Exam 05/24/2020 5/24/2019 (Done)    Override on 5/24/2019: Done (Podiatry)    Aspirin/Antiplatelet Therapy 09/24/2020 9/24/2019    TETANUS VACCINE 09/13/2027 9/13/2017        No orders of the defined types were placed in this encounter.    The following information is provided to all patients.  This information is to help you find resources for any of the problems found today that may be affecting your health:                Living healthy guide: www.[facility].[place].gov      Understanding Diabetes: www.diabetes.org      Eating healthy: www.cdc.gov/healthyweight      CDC home safety checklist: www.cdc.gov/steadi/patient.html      Agency on Aging: www.goea.[place].[facility]      Alcoholics anonymous (AA): www.aa.org      Physical Activity: www.rico.nih.gov/og9kyju      Tobacco use: www.quitwithusla.org

## 2019-10-07 NOTE — PROGRESS NOTES
"William Mac presented for a  Medicare AWV and comprehensive Health Risk Assessment today. The following components were reviewed and updated:    · Medical history  · Family History  · Social history  · Allergies and Current Medications  · Health Risk Assessment  · Health Maintenance  · Care Team     ** See Completed Assessments for Annual Wellness Visit within the encounter summary.**     The following assessments were completed:  · Living Situation  · CAGE  · Depression Screening  · Timed Get Up and Go  · Whisper Test  · Cognitive Function Screening-N/A followed by neurology for Parkinsons and Dementia  · Nutrition Screening  · ADL Screening  · PAQ Screening    Vitals:    10/07/19 0956   BP: 120/68   BP Location: Left arm   Patient Position: Sitting   BP Method: Medium (Manual)   Pulse: 66   SpO2: 97%   Weight: 87.1 kg (192 lb 0.3 oz)   Height: 6' 1" (1.854 m)     Body mass index is 25.33 kg/m².  Physical Exam   Constitutional: He is oriented to person, place, and time. He appears well-nourished.   Cardiovascular: Normal rate, regular rhythm, normal heart sounds and intact distal pulses.   Pulmonary/Chest: Effort normal and breath sounds normal.   Neurological: He is alert and oriented to person, place, and time.   Skin: Skin is warm and dry.   Vitals reviewed.      Diagnoses and health risks identified today and associated recommendations/orders:    1. Encounter for preventive health examination  Reviewed and discussed health maintenance.      2. Hyperlipidemia, unspecified hyperlipidemia type  Stable- continue current treatment and follow up routinely with PCP     3. Essential hypertension  Stable- continue current treatment and follow up routinely with PCP     4. Peripheral vascular disease  Stable- continue current treatment and follow up routinely with PCP     5. Coronary artery disease, angina presence unspecified, unspecified vessel or lesion type, unspecified whether native or transplanted heart  Stable- " continue current treatment and follow up routinely with PCP     6. Hypertriglyceridemia  Stable- continue current treatment and follow up routinely with PCP     7. Thrombocytopenia  Stable- continue current treatment and follow up routinely with PCP     8. Uncontrolled type 2 diabetes mellitus with hyperglycemia  Stable- continue current treatment and follow up routinely with PCP     9. Type II diabetes mellitus with neurological manifestations  Stable- continue current treatment and follow up routinely with PCP     10. Diabetic neurogenic arthropathy  Stable- continue current treatment and follow up routinely with PCP     11. Diabetic polyneuropathy associated with type 2 diabetes mellitus  Stable- continue current treatment and follow up routinely with PCP and podiatry (Dr. Verde)    12. Onychomycosis of multiple toenails with type 2 diabetes mellitus  Stable- continue current treatment and follow up routinely with PCP and podiatry (Dr. Verde)    13. Moderate episode of recurrent major depressive disorder  Stable- continue current treatment and follow up routinely with PCP     14. Dementia without behavioral disturbance, unspecified dementia type  Stable- continue current treatment and follow up routinely with PCP and neurology (Dr. Mccann)    15. Parkinson's disease  Stable- continue current treatment and follow up routinely with PCP and neurology (Dr. Mccann)    Provided William with a 5-10 year written screening schedule and personal prevention plan. Recommendations were developed using the USPSTF age appropriate recommendations. Education, counseling, and referrals were provided as needed. After Visit Summary printed and given to patient which includes a list of additional screenings\tests needed.      Lucy Cruz NP
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
## 2019-11-11 ENCOUNTER — TELEPHONE (OUTPATIENT)
Dept: NEUROLOGY | Facility: CLINIC | Age: 84
End: 2019-11-11

## 2019-11-11 NOTE — TELEPHONE ENCOUNTER
----- Message from Eugenia Sierra sent at 11/11/2019 12:52 PM CST -----  Contact: Patient - Missed appt on 11/11/19   Please call the above patient to reschedule their appointment with Yumiko Martinez from 11/11/19.  Patient was late for appointment/Consult by 45 mins and needed to reschedule per Yumiko.  Please call patient to reschedule appointment here in [place].  Patient can be reached at [phone].    Thanks,

## 2019-12-03 ENCOUNTER — TELEPHONE (OUTPATIENT)
Dept: NEUROLOGY | Facility: CLINIC | Age: 84
End: 2019-12-03

## 2019-12-03 NOTE — TELEPHONE ENCOUNTER
----- Message from Deidre Laughlin sent at 12/3/2019  4:00 PM CST -----  Contact: Wife Cailin Palumbo [phone]  Please call her about the possibility of a sooner appt.  His walking has gotten worse.  Thank you!

## 2019-12-09 RX ORDER — CARBIDOPA AND LEVODOPA 25; 100 MG/1; MG/1
TABLET ORAL
Qty: 180 TABLET | Refills: 0 | OUTPATIENT
Start: 2019-12-09

## 2019-12-16 NOTE — TELEPHONE ENCOUNTER
----- Message from Marilu Perez sent at 12/16/2019  8:12 AM CST -----  Contact: Wife [phone]  Patient would like a refill for carbidopa-levodopa  mg (SINEMET)  mg per tablet sent to [facility] - [place] - [address] [address]. Please advise.

## 2020-01-01 ENCOUNTER — TELEPHONE (OUTPATIENT)
Dept: NEUROLOGY | Facility: CLINIC | Age: 85
End: 2020-01-01

## 2020-01-01 ENCOUNTER — OFFICE VISIT (OUTPATIENT)
Dept: FAMILY MEDICINE | Facility: CLINIC | Age: 85
End: 2020-01-01
Payer: MEDICARE

## 2020-01-01 ENCOUNTER — TELEPHONE (OUTPATIENT)
Dept: FAMILY MEDICINE | Facility: CLINIC | Age: 85
End: 2020-01-01

## 2020-01-01 ENCOUNTER — OFFICE VISIT (OUTPATIENT)
Dept: NEUROLOGY | Facility: CLINIC | Age: 85
End: 2020-01-01
Payer: MEDICARE

## 2020-01-01 ENCOUNTER — PATIENT OUTREACH (OUTPATIENT)
Dept: ADMINISTRATIVE | Facility: OTHER | Age: 85
End: 2020-01-01

## 2020-01-01 ENCOUNTER — TELEPHONE (OUTPATIENT)
Dept: CARDIOLOGY | Facility: CLINIC | Age: 85
End: 2020-01-01

## 2020-01-01 ENCOUNTER — OFFICE VISIT (OUTPATIENT)
Dept: CARDIOLOGY | Facility: CLINIC | Age: 85
End: 2020-01-01
Payer: MEDICARE

## 2020-01-01 ENCOUNTER — LAB VISIT (OUTPATIENT)
Dept: LAB | Facility: HOSPITAL | Age: 85
End: 2020-01-01
Attending: FAMILY MEDICINE
Payer: MEDICARE

## 2020-01-01 ENCOUNTER — PATIENT MESSAGE (OUTPATIENT)
Dept: ADMINISTRATIVE | Facility: HOSPITAL | Age: 85
End: 2020-01-01

## 2020-01-01 VITALS
BODY MASS INDEX: 26.24 KG/M2 | SYSTOLIC BLOOD PRESSURE: 102 MMHG | HEART RATE: 74 BPM | HEIGHT: 73 IN | DIASTOLIC BLOOD PRESSURE: 56 MMHG | WEIGHT: 198 LBS | OXYGEN SATURATION: 97 %

## 2020-01-01 VITALS
RESPIRATION RATE: 16 BRPM | WEIGHT: 196.88 LBS | HEART RATE: 64 BPM | SYSTOLIC BLOOD PRESSURE: 100 MMHG | BODY MASS INDEX: 26.67 KG/M2 | HEIGHT: 72 IN | DIASTOLIC BLOOD PRESSURE: 57 MMHG

## 2020-01-01 VITALS
HEIGHT: 73 IN | SYSTOLIC BLOOD PRESSURE: 130 MMHG | WEIGHT: 195.31 LBS | HEART RATE: 72 BPM | DIASTOLIC BLOOD PRESSURE: 60 MMHG | BODY MASS INDEX: 25.88 KG/M2 | RESPIRATION RATE: 18 BRPM

## 2020-01-01 DIAGNOSIS — I10 ESSENTIAL HYPERTENSION: ICD-10-CM

## 2020-01-01 DIAGNOSIS — E53.8 B12 DEFICIENCY: ICD-10-CM

## 2020-01-01 DIAGNOSIS — Z95.1 S/P CABG (CORONARY ARTERY BYPASS GRAFT): ICD-10-CM

## 2020-01-01 DIAGNOSIS — R29.818 FOCAL NEUROLOGICAL DEFICIT: ICD-10-CM

## 2020-01-01 DIAGNOSIS — E78.5 HYPERLIPIDEMIA, UNSPECIFIED HYPERLIPIDEMIA TYPE: ICD-10-CM

## 2020-01-01 DIAGNOSIS — E11.49 TYPE II DIABETES MELLITUS WITH NEUROLOGICAL MANIFESTATIONS: ICD-10-CM

## 2020-01-01 DIAGNOSIS — F03.90 DEMENTIA WITHOUT BEHAVIORAL DISTURBANCE, UNSPECIFIED DEMENTIA TYPE: ICD-10-CM

## 2020-01-01 DIAGNOSIS — E11.42 DIABETIC POLYNEUROPATHY ASSOCIATED WITH TYPE 2 DIABETES MELLITUS: ICD-10-CM

## 2020-01-01 DIAGNOSIS — E11.49 TYPE II DIABETES MELLITUS WITH NEUROLOGICAL MANIFESTATIONS: Primary | ICD-10-CM

## 2020-01-01 DIAGNOSIS — G20.A1 PARKINSON'S DISEASE: ICD-10-CM

## 2020-01-01 DIAGNOSIS — I25.10 CORONARY ARTERY DISEASE INVOLVING NATIVE CORONARY ARTERY OF NATIVE HEART, ANGINA PRESENCE UNSPECIFIED: Primary | ICD-10-CM

## 2020-01-01 DIAGNOSIS — G20.A1 PARKINSON'S DISEASE: Primary | ICD-10-CM

## 2020-01-01 DIAGNOSIS — F32.A DEPRESSION, UNSPECIFIED DEPRESSION TYPE: ICD-10-CM

## 2020-01-01 DIAGNOSIS — I25.10 CORONARY ARTERY DISEASE, ANGINA PRESENCE UNSPECIFIED, UNSPECIFIED VESSEL OR LESION TYPE, UNSPECIFIED WHETHER NATIVE OR TRANSPLANTED HEART: ICD-10-CM

## 2020-01-01 DIAGNOSIS — F03.90 DEMENTIA WITHOUT BEHAVIORAL DISTURBANCE, UNSPECIFIED DEMENTIA TYPE: Primary | ICD-10-CM

## 2020-01-01 LAB
ALBUMIN SERPL BCP-MCNC: 4.4 G/DL (ref 3.5–5.2)
ALP SERPL-CCNC: 78 U/L (ref 55–135)
ALT SERPL W/O P-5'-P-CCNC: 15 U/L (ref 10–44)
ANION GAP SERPL CALC-SCNC: 8 MMOL/L (ref 8–16)
AST SERPL-CCNC: 18 U/L (ref 10–40)
BASOPHILS # BLD AUTO: 0.03 K/UL (ref 0–0.2)
BASOPHILS NFR BLD: 0.4 % (ref 0–1.9)
BILIRUB SERPL-MCNC: 0.5 MG/DL (ref 0.1–1)
BUN SERPL-MCNC: 13 MG/DL (ref 8–23)
CALCIUM SERPL-MCNC: 9.8 MG/DL (ref 8.7–10.5)
CHLORIDE SERPL-SCNC: 106 MMOL/L (ref 95–110)
CHOLEST SERPL-MCNC: 183 MG/DL (ref 120–199)
CHOLEST/HDLC SERPL: 5.5 {RATIO} (ref 2–5)
CO2 SERPL-SCNC: 29 MMOL/L (ref 23–29)
CREAT SERPL-MCNC: 1 MG/DL (ref 0.5–1.4)
DIFFERENTIAL METHOD: ABNORMAL
EOSINOPHIL # BLD AUTO: 0.4 K/UL (ref 0–0.5)
EOSINOPHIL NFR BLD: 5.3 % (ref 0–8)
ERYTHROCYTE [DISTWIDTH] IN BLOOD BY AUTOMATED COUNT: 12.8 % (ref 11.5–14.5)
EST. GFR  (AFRICAN AMERICAN): >60 ML/MIN/1.73 M^2
EST. GFR  (NON AFRICAN AMERICAN): >60 ML/MIN/1.73 M^2
ESTIMATED AVG GLUCOSE: 148 MG/DL (ref 68–131)
GLUCOSE SERPL-MCNC: 127 MG/DL (ref 70–110)
HBA1C MFR BLD HPLC: 6.8 % (ref 4–5.6)
HCT VFR BLD AUTO: 49.3 % (ref 40–54)
HDLC SERPL-MCNC: 33 MG/DL (ref 40–75)
HDLC SERPL: 18 % (ref 20–50)
HGB BLD-MCNC: 15.9 G/DL (ref 14–18)
IMM GRANULOCYTES # BLD AUTO: 0.02 K/UL (ref 0–0.04)
IMM GRANULOCYTES NFR BLD AUTO: 0.3 % (ref 0–0.5)
LDLC SERPL CALC-MCNC: 107.8 MG/DL (ref 63–159)
LYMPHOCYTES # BLD AUTO: 2 K/UL (ref 1–4.8)
LYMPHOCYTES NFR BLD: 28.1 % (ref 18–48)
MCH RBC QN AUTO: 31.5 PG (ref 27–31)
MCHC RBC AUTO-ENTMCNC: 32.3 G/DL (ref 32–36)
MCV RBC AUTO: 98 FL (ref 82–98)
MONOCYTES # BLD AUTO: 0.7 K/UL (ref 0.3–1)
MONOCYTES NFR BLD: 10.2 % (ref 4–15)
NEUTROPHILS # BLD AUTO: 3.9 K/UL (ref 1.8–7.7)
NEUTROPHILS NFR BLD: 55.7 % (ref 38–73)
NONHDLC SERPL-MCNC: 150 MG/DL
NRBC BLD-RTO: 0 /100 WBC
PLATELET # BLD AUTO: 141 K/UL (ref 150–350)
PMV BLD AUTO: 9 FL (ref 9.2–12.9)
POTASSIUM SERPL-SCNC: 4.8 MMOL/L (ref 3.5–5.1)
PROT SERPL-MCNC: 7.7 G/DL (ref 6–8.4)
RBC # BLD AUTO: 5.04 M/UL (ref 4.6–6.2)
SODIUM SERPL-SCNC: 143 MMOL/L (ref 136–145)
TRIGL SERPL-MCNC: 211 MG/DL (ref 30–150)
TSH SERPL DL<=0.005 MIU/L-ACNC: 1.81 UIU/ML (ref 0.4–4)
WBC # BLD AUTO: 6.98 K/UL (ref 3.9–12.7)

## 2020-01-01 PROCEDURE — 1159F PR MEDICATION LIST DOCUMENTED IN MEDICAL RECORD: ICD-10-PCS | Mod: ,,, | Performed by: NURSE PRACTITIONER

## 2020-01-01 PROCEDURE — 99214 OFFICE O/P EST MOD 30 MIN: CPT | Mod: S$PBB,,, | Performed by: FAMILY MEDICINE

## 2020-01-01 PROCEDURE — 99214 PR OFFICE/OUTPT VISIT, EST, LEVL IV, 30-39 MIN: ICD-10-PCS | Mod: S$PBB,,, | Performed by: FAMILY MEDICINE

## 2020-01-01 PROCEDURE — 85025 COMPLETE CBC W/AUTO DIFF WBC: CPT

## 2020-01-01 PROCEDURE — 99999 PR PBB SHADOW E&M-EST. PATIENT-LVL III: CPT | Mod: PBBFAC,,, | Performed by: FAMILY MEDICINE

## 2020-01-01 PROCEDURE — 99213 OFFICE O/P EST LOW 20 MIN: CPT | Mod: PBBFAC,PO | Performed by: FAMILY MEDICINE

## 2020-01-01 PROCEDURE — 99999 PR PBB SHADOW E&M-EST. PATIENT-LVL III: CPT | Mod: PBBFAC,,, | Performed by: NURSE PRACTITIONER

## 2020-01-01 PROCEDURE — 99214 OFFICE O/P EST MOD 30 MIN: CPT | Mod: S$PBB,,, | Performed by: NURSE PRACTITIONER

## 2020-01-01 PROCEDURE — 80053 COMPREHEN METABOLIC PANEL: CPT

## 2020-01-01 PROCEDURE — 99999 PR PBB SHADOW E&M-EST. PATIENT-LVL III: ICD-10-PCS | Mod: PBBFAC,,, | Performed by: FAMILY MEDICINE

## 2020-01-01 PROCEDURE — 99999 PR PBB SHADOW E&M-EST. PATIENT-LVL IV: CPT | Mod: PBBFAC,,, | Performed by: PHYSICIAN ASSISTANT

## 2020-01-01 PROCEDURE — 84443 ASSAY THYROID STIM HORMONE: CPT

## 2020-01-01 PROCEDURE — 36415 COLL VENOUS BLD VENIPUNCTURE: CPT | Mod: PO

## 2020-01-01 PROCEDURE — 99213 OFFICE O/P EST LOW 20 MIN: CPT | Mod: PBBFAC,PN | Performed by: NURSE PRACTITIONER

## 2020-01-01 PROCEDURE — 99214 PR OFFICE/OUTPT VISIT, EST, LEVL IV, 30-39 MIN: ICD-10-PCS | Mod: S$PBB,,, | Performed by: PHYSICIAN ASSISTANT

## 2020-01-01 PROCEDURE — 99999 PR PBB SHADOW E&M-EST. PATIENT-LVL III: ICD-10-PCS | Mod: PBBFAC,,, | Performed by: NURSE PRACTITIONER

## 2020-01-01 PROCEDURE — 99214 OFFICE O/P EST MOD 30 MIN: CPT | Mod: S$PBB,,, | Performed by: PHYSICIAN ASSISTANT

## 2020-01-01 PROCEDURE — 99214 PR OFFICE/OUTPT VISIT, EST, LEVL IV, 30-39 MIN: ICD-10-PCS | Mod: S$PBB,,, | Performed by: NURSE PRACTITIONER

## 2020-01-01 PROCEDURE — 83036 HEMOGLOBIN GLYCOSYLATED A1C: CPT

## 2020-01-01 PROCEDURE — 99999 PR PBB SHADOW E&M-EST. PATIENT-LVL IV: ICD-10-PCS | Mod: PBBFAC,,, | Performed by: PHYSICIAN ASSISTANT

## 2020-01-01 PROCEDURE — 99214 OFFICE O/P EST MOD 30 MIN: CPT | Mod: PBBFAC,PO | Performed by: PHYSICIAN ASSISTANT

## 2020-01-01 PROCEDURE — 1159F MED LIST DOCD IN RCRD: CPT | Mod: ,,, | Performed by: NURSE PRACTITIONER

## 2020-01-01 PROCEDURE — 80061 LIPID PANEL: CPT

## 2020-01-01 RX ORDER — LISINOPRIL 5 MG/1
5 TABLET ORAL DAILY
Qty: 30 TABLET | Refills: 11 | Status: SHIPPED | OUTPATIENT
Start: 2020-01-01 | End: 2020-01-01 | Stop reason: SDUPTHER

## 2020-01-01 RX ORDER — CARBIDOPA AND LEVODOPA 25; 100 MG/1; MG/1
2 TABLET ORAL 3 TIMES DAILY
Qty: 180 TABLET | Refills: 11 | Status: SHIPPED | OUTPATIENT
Start: 2020-01-01 | End: 2020-01-01

## 2020-01-01 RX ORDER — CLOPIDOGREL BISULFATE 75 MG/1
75 TABLET ORAL DAILY
Qty: 30 TABLET | Refills: 11 | Status: SHIPPED | OUTPATIENT
Start: 2020-01-01 | End: 2020-01-01

## 2020-01-01 RX ORDER — ROSUVASTATIN CALCIUM 5 MG/1
5 TABLET, COATED ORAL NIGHTLY
Qty: 30 TABLET | Refills: 11 | Status: SHIPPED | OUTPATIENT
Start: 2020-01-01 | End: 2020-01-01

## 2020-01-01 RX ORDER — LANOLIN ALCOHOL/MO/W.PET/CERES
2000 CREAM (GRAM) TOPICAL DAILY
COMMUNITY
End: 2020-01-01

## 2020-01-01 RX ORDER — CLOPIDOGREL BISULFATE 75 MG/1
75 TABLET ORAL DAILY
Qty: 30 TABLET | Refills: 0 | Status: SHIPPED | OUTPATIENT
Start: 2020-01-01 | End: 2020-01-01 | Stop reason: SDUPTHER

## 2020-01-01 RX ORDER — VITAMIN B COMPLEX
1 CAPSULE ORAL DAILY
COMMUNITY
End: 2020-01-01

## 2020-01-01 RX ORDER — ROSUVASTATIN CALCIUM 5 MG/1
5 TABLET, COATED ORAL NIGHTLY
Qty: 30 TABLET | Refills: 0 | Status: SHIPPED | OUTPATIENT
Start: 2020-01-01 | End: 2020-01-01 | Stop reason: SDUPTHER

## 2020-01-01 RX ORDER — SERTRALINE HYDROCHLORIDE 50 MG/1
TABLET, FILM COATED ORAL
Qty: 90 TABLET | Refills: 0 | Status: SHIPPED | OUTPATIENT
Start: 2020-01-01 | End: 2020-01-01

## 2020-01-01 RX ORDER — LISINOPRIL 5 MG/1
5 TABLET ORAL DAILY
Qty: 60 TABLET | Refills: 0 | Status: SHIPPED | OUTPATIENT
Start: 2020-01-01 | End: 2020-01-01 | Stop reason: SDUPTHER

## 2020-01-01 RX ORDER — CARBIDOPA AND LEVODOPA 25; 100 MG/1; MG/1
TABLET ORAL
Qty: 180 TABLET | Refills: 3 | Status: SHIPPED | OUTPATIENT
Start: 2020-01-01 | End: 2020-01-01 | Stop reason: SDUPTHER

## 2020-01-01 RX ORDER — MAGNESIUM 250 MG
1 TABLET ORAL ONCE
COMMUNITY
End: 2020-01-01

## 2020-01-20 NOTE — PROGRESS NOTES
Name: William Mac  MRN: [id number]   CSN: [id number]      Date: 1-      Referring physician:  Lindsay Mccann MD  [address] [place] [address]    Subjective:      Chief Complaint: Follow up     History of Present Illness (HPI):    William Mac is a 84 y.o.  male witih CAD, DM 2 and peripheral neuropathy, HLD, HTN who arrives late today for a follow-up evaluation (new to me) of pdism, highly suspicious for vascular pdism, B12 deficiency, dementia without behavioral disturbance ([facility] 2-2019-13/30), cerebral ventriculomegaly, s/p spinal cord stimulator (2015) and is accompanied by daughter and wife. Last seen in office by Dr. Mccann 8-8-2019. 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 Did complete PT but he does not think it helped. Was very difficult for him as he could not get to gym or back in home without assist of two people. He feels he is doing better this past week without therapy.     Wife says carbidopa/levodopa does help. Takes 2 tabs at 8-12-4.     Denies tremor.   Denies stiffness, except sometimes calves get tight.   Aware of slowness- cognitively.  More trouble getting words to come pout than fidning the word. Feels he gets mixed up. Also notes slowness physically.  Balance is not real good. Does fall. May fall 2 times in one day and then not again for a week.   Has cane and walker (standard and rollator) at home. Also has transport wc.    Wife agrees with hx he has provided.     Does have times where his feet won't move.     No longer taking B12 injection. Taking MVI otc.     Will occ see a flash of something, not formed image. Wife disagrees. She says he has seen puffs of smoke in trees that aren't there or someone walking in front of him when they are watching TV. Not frequent. Not bothersome.     Review of Systems   HENT: Negative for trouble swallowing.     Musculoskeletal: Positive for gait problem.   Neurological: Negative for tremors.   Psychiatric/Behavioral: Positive for hallucinations and sleep disturbance (active in sleep per wife).       Past Medical History: The patient  has a past medical history of Arthritis, Back pain, CABG (coronary artery bypass graft) (1985 and 2003), CAD (coronary artery disease), Cervical vertebral fracture (past Hx), Colon polyps, Depression, Diabetes mellitus, Hypertension, Hypertriglyceridemia, Neuropathy, PVD (peripheral vascular disease), Vascular disease, peripheral, and Vitamin B12 deficiency.    Social History: The patient  reports that he has quit smoking. He smoked 2.00 packs per day. He has quit using smokeless tobacco. He reports that he does not drink alcohol or use drugs.    Family History: Their family history includes Diabetes in his mother, sister, and sister; Hypertension in his mother and sister; No Known Problems in his father.    Allergies: Adhesive     Meds:   Current Outpatient Medications on File Prior to Visit   Medication Sig Dispense Refill    aspirin (ECOTRIN) 81 MG EC tablet Take 81 mg by mouth.      carbidopa-levodopa  mg (SINEMET)  mg per tablet Take 2 tablets by mouth 3 (three) times daily. Have patient take first dose 30 minutes before getting out of bed. 180 tablet 3    diclofenac sodium (VOLTAREN) 1 % Gel Apply 2 g topically once daily. 100 g 0    fluocinolone acetonide oil 0.01 % Drop fluocinolone acetonide oil 0.01 % ear drops      hydrocodone-acetaminophen 5-325mg (NORCO) 5-325 mg per tablet Take 1 tablet by mouth.      IRON/MULTIVITS,STRESS FORMULA (MULTIVITAMIN, STRESS FORMULA ORAL) Take 1 tablet by mouth once daily.        ketoconazole (NIZORAL) 2 % cream APPLY TO AFFECTED AREA 1 TO 2 TIMES A DAY FOR PSORIASIS OF THE FACE  5    ketoconazole (NIZORAL) 2 % shampoo LATHER AND LEAVE ON 3 TO 5 MINUTES, THEN RINSE  USE FREQUENTLY  FEW TIMES A WEEK    OKAY FOR FACE  10     "metFORMIN (GLUCOPHAGE) 1000 MG tablet Take 0.5 tablets (500 mg total) by mouth 2 (two) times daily. 180 tablet 3    senna (SENOKOT) 8.6 mg tablet Take 2 tablets by mouth.      sertraline (ZOLOFT) 50 MG tablet TAKE 1 TABLET BY MOUTH ONCE DAILY 90 tablet 1    ALLERGY RELIEF, LORATADINE, 10 mg tablet TAKE 1 TABLET BY MOUTH ONCE DAILY (Patient not taking: Reported on 1/20/2020) 90 tablet 0    cyanocobalamin 1,000 mcg/mL injection 1000mcg IM weekly x 4 weeks (Patient not taking: Reported on 1/20/2020) 1 mL 4    cyanocobalamin 1,000 mcg/mL injection 1000mcg IM once per month (Patient not taking: Reported on 1/20/2020) 1 mL 11    cyclobenzaprine (FLEXERIL) 5 MG tablet TAKE ONE TABLET BY MOUTH THREE TIMES A DAY AS NEEDED FOR MUSCLE SPASMS AS DIRECTED   MAY CAUSE DROWSINESS      HYDROcodone-acetaminophen (NORCO) 7.5-325 mg per tablet TAKE 1 TABLET BY MOUTH ONCE A DAY AS NEEDED FOR PAIN  0     No current facility-administered medications on file prior to visit.        Objective:     Physical Exam:    Vitals:    01/20/20 0937   BP: 130/60   Pulse: 72   Resp: 18   Weight: 88.6 kg (195 lb 5.2 oz)   Height: 6' 1" (1.854 m)     Body mass index is 25.77 kg/m².    Constitutional  Well-developed, well-nourished, appears stated age   Cardiovascular  Radial pulses 2+ and symmetric, no LE edema bilaterally     ..III.  MOTOR EXAMINATION -  Last dose 0800- exam time 1000       Speech  2 - Monotone, slurred but understandable; moderately impaired.   Facial Expression  2 - Slight but definitely abnormal diminution of facial expression.    Tremor at Rest:      Face, lips, chin 0 - Absent.    Hands:      right 0 - Absent.    left 0 - Absent.    Feet:      right 0 - Absent.    left 0 - Absent.    Action or Postural Tremor of Hands      right 0 - Absent.    left 0 - Absent.    Rigidity      Neck 1 - Slight or detectable only when activated by mirror or other movements.   Upper Extremity: Right 1 - Slight or detectable only when activated " by mirror or other movements.   Upper Extremity: Left 0 - Absent.   Lower Extremity: Right 1 - Slight or detectable only when activated by mirror or other movements.   Lower Extremity: Left 1 - Slight or detectable only when activated by mirror or other movements.   Finger Taps      right 2 - Moderately impaired. Definite and early fatiguing. May have occasional arrests in movement.   left 2 - Moderately impaired. Definite and early fatiguing. May have occasional arrests in movement.   Hand Movements      right 2 - Moderately impaired. Definite and early fatiguing. May have occasional arrests in movement.   left 2 - Moderately impaired. Definite and early fatiguing. May have occasional arrests in movement.   Rapid Alternating Movements of Hands      right 2 - Moderately impaired. Definite and early fatiguing. May have occasional arrests in movement.   left 2 - Moderately impaired. Definite and early fatiguing. May have occasional arrests in movement.   Leg Agility      right 2 - Moderately impaired. Definite and early fatiguing. May have occasional arrests in movement.   left 2 - Moderately impaired. Definite and early fatiguing. May have occasional arrests in movement.   Arising from Chair  2 - Pushes self up from arms of seat.   Posture  2 - Moderately stooped posture, definitely abnormal; can be slightly leaning ot one side.    Gait  2 - Walks with difficulty, but requires little or no assistance; may have some festination, short steps, or propulsion.   Postural Stability (Response to sudden, strong posterior displacement produced by pull on shoulders while patient erect with eyes open and feet slightly apart. Patient is prepared, and can have had some practice runs.)  deferred   Body Bradykinesia and Hypokinesia (Combining slowness, hesitancy, decreased armswing, small amplitude, and poverty of movement in general)  2 - Mild degree of slowness and poverty of movement which is definitely abnormal.          Laboratory Results:  No visits with results within 3 Month(s) from this visit.   Latest known visit with results is:   Lab Visit on 09/24/2019   Component Date Value Ref Range Status    WBC 09/24/2019 6.57  3.90 - 12.70 K/uL Final    RBC 09/24/2019 4.56* 4.60 - 6.20 M/uL Final    Hemoglobin 09/24/2019 14.3  14.0 - 18.0 g/dL Final    Hematocrit 09/24/2019 45.1  40.0 - 54.0 % Final    Mean Corpuscular Volume 09/24/2019 99* 82 - 98 fL Final    Mean Corpuscular Hemoglobin 09/24/2019 31.4* 27.0 - 31.0 pg Final    Mean Corpuscular Hemoglobin Conc 09/24/2019 31.7* 32.0 - 36.0 g/dL Final    RDW 09/24/2019 13.1  11.5 - 14.5 % Final    Platelets 09/24/2019 123* 150 - 350 K/uL Final    MPV 09/24/2019 9.1* 9.2 - 12.9 fL Final    Immature Granulocytes 09/24/2019 0.2  0.0 - 0.5 % Final    Gran # (ANC) 09/24/2019 3.9  1.8 - 7.7 K/uL Final    Immature Grans (Abs) 09/24/2019 0.01  0.00 - 0.04 K/uL Final    Lymph # 09/24/2019 1.8  1.0 - 4.8 K/uL Final    Mono # 09/24/2019 0.6  0.3 - 1.0 K/uL Final    Eos # 09/24/2019 0.3  0.0 - 0.5 K/uL Final    Baso # 09/24/2019 0.04  0.00 - 0.20 K/uL Final    nRBC 09/24/2019 0  0 /100 WBC Final    Gran% 09/24/2019 58.6  38.0 - 73.0 % Final    Lymph% 09/24/2019 27.2  18.0 - 48.0 % Final    Mono% 09/24/2019 8.5  4.0 - 15.0 % Final    Eosinophil% 09/24/2019 4.9  0.0 - 8.0 % Final    Basophil% 09/24/2019 0.6  0.0 - 1.9 % Final    Differential Method 09/24/2019 Automated   Final    Sodium 09/24/2019 141  136 - 145 mmol/L Final    Potassium 09/24/2019 4.3  3.5 - 5.1 mmol/L Final    Chloride 09/24/2019 106  95 - 110 mmol/L Final    CO2 09/24/2019 27  23 - 29 mmol/L Final    Glucose 09/24/2019 159* 70 - 110 mg/dL Final    BUN, Bld 09/24/2019 11  8 - 23 mg/dL Final    Creatinine 09/24/2019 0.9  0.5 - 1.4 mg/dL Final    Calcium 09/24/2019 9.1  8.7 - 10.5 mg/dL Final    Total Protein 09/24/2019 6.9  6.0 - 8.4 g/dL Final    Albumin 09/24/2019 3.9  3.5 - 5.2 g/dL Final     Total Bilirubin 09/24/2019 0.5  0.1 - 1.0 mg/dL Final    Alkaline Phosphatase 09/24/2019 61  55 - 135 U/L Final    AST 09/24/2019 14  10 - 40 U/L Final    ALT 09/24/2019 <5* 10 - 44 U/L Final    Anion Gap 09/24/2019 8  8 - 16 mmol/L Final    eGFR if African American 09/24/2019 >60.0  >60 mL/min/1.73 m^2 Final    eGFR if non African American 09/24/2019 >60.0  >60 mL/min/1.73 m^2 Final    Hemoglobin A1C 09/24/2019 5.9* 4.0 - 5.6 % Final    Estimated Avg Glucose 09/24/2019 123  68 - 131 mg/dL Final       Imaging:   The following is from Dr. Mccann's note 8-8-19:  Laboratory/ Radiological Data: (Personally reviewed images)                            Lab Results   Component Value Date     TSH 1.613 09/21/2018            Lab Results   Component Value Date     [id number] 214 02/08/2018           Assessment and Plan     Parkinson's disease    Dementia without behavioral disturbance, unspecified dementia type    B12 deficiency    Coronary artery disease, angina presence unspecified, unspecified vessel or lesion type, unspecified whether native or transplanted heart    Diabetic polyneuropathy associated with type 2 diabetes mellitus    Essential hypertension    Hyperlipidemia, unspecified hyperlipidemia type    S/P CABG (coronary artery bypass graft)        Medical Decision Making:    Cont carbidopa/levodopa without change.   Rec B12 2000 mcg daily with meal.   Encouraged exercise.   Discussed techniques for FOG.     I spent 35 minutes face-to-face with the patient, wife and dtr with >50% of the time spent with counseling and education regarding:  - results of data, diagnosis, and recommendations stated above  - the prognosis of pdism  - risks and benefits of carbidopa/levodopa   -rationale of B12  -demonstrated techniques for FOG  - importance of diet and exercise    Yumiko Martinez, DNP, NP-C  Division of Movement and Memory Disorders  [facility]  [phone]

## 2020-01-20 NOTE — LETTER
January 20, 2020      Lindsay Mccann MD  [address] [facility] [address]           [facility]  [address] [place] [address]  Phone: [phone]  Fax: [phone]          Patient: William Mac   MR Number: [id number]   YOB: 1935   Date of Visit: 1/20/2020       Dear Dr. Lindsay Mccann:    Thank you for referring William Mac to me for evaluation. Attached you will find relevant portions of my assessment and plan of care.    If you have questions, please do not hesitate to call me. I look forward to following William Mac along with you.    Sincerely,    Yumiko Martinez, TORO    Enclosure  CC:  No Recipients    If you would like to receive this communication electronically, please contact [email or web address] or [phone] to request more information on [employer] Link access.    For providers and/or their staff who would like to refer a patient to [facility], please contact us through our one-stop-shop provider referral line, [facility] , at [phone].    If you feel you have received this communication in error or would no longer like to receive these types of communications, please e-mail [email or web address]

## 2020-01-20 NOTE — PATIENT INSTRUCTIONS
Continue carbidopa/levodopa without change.     By exam today, I recommend using an assistive device when walking. I prefer you use a walker, rather than a cane.     Rec taking Vit B12- 2000 mcg daily with meal.     Follow up 4-5 months in [place] with Dr. Mccann.

## 2020-01-21 NOTE — PROGRESS NOTES
Refill Authorization Note     is requesting a refill authorization.    Brief assessment and rationale for refill: APPROVE: prr          Medication Therapy Plan: FOVS(02/20); Approve 3 more months                              Comments:   Requested Prescriptions   Pending Prescriptions Disp Refills    sertraline (ZOLOFT) 50 MG tablet [Pharmacy Med Name: SERTRALINE HCL 50 MG TABS 50 TAB] 90 tablet 0     Sig: TAKE 1 TABLET BY MOUTH ONCE DAILY       Psychiatry:  Antidepressants - SSRI Passed - 1/20/2020 11:01 AM        Passed - Patient is at least 18 years old        Passed - Last BP in normal range within 360 days     BP Readings from Last 3 Encounters:   01/20/20 130/60   10/07/19 98/66   10/07/19 120/68              Passed - Office visit in past 6 months or future 90 days     Recent Outpatient Visits            Yesterday Parkinson's disease    [facility] Yumiko Martinez NP    3 months ago Encounter for preventive health examination    [facility] Lucy Cruz NP    3 months ago Onychomycosis due to dermatophyte    [facility] Erin Verde DPM    3 months ago Fatigue, unspecified type    [facility] Shaye Prado PA-C    5 months ago Parkinson's disease    [facility] Lindsay Mccann MD          Future Appointments              In 1 week URINE [facility] [place]    In 1 week LAB, [place] [facility] [place]    In 2 weeks SAM Wen MD [facility]    In 3 months [facility]

## 2020-02-06 NOTE — PROGRESS NOTES
Subjective:       Patient ID: William Mac is a 84 y.o. male.    Chief Complaint: Tremors (follow up)    Here for f/u htn and dm II. Doing well overall. Here with wife and daughter.    Hypertension   This is a chronic problem. The current episode started more than 1 year ago. The problem is controlled. Pertinent negatives include no chest pain, palpitations or shortness of breath.   Hyperlipidemia   This is a chronic problem. The current episode started more than 1 year ago. The problem is controlled. Pertinent negatives include no chest pain or shortness of breath.   Diabetes   He presents for his follow-up diabetic visit. He has type 2 diabetes mellitus. His disease course has been stable. Pertinent negatives for hypoglycemia include no nervousness/anxiousness. Pertinent negatives for diabetes include no chest pain.     Review of Systems   Constitutional: Negative for chills and fever.   Respiratory: Negative for cough, chest tightness and shortness of breath.    Cardiovascular: Negative for chest pain, palpitations and leg swelling.   Endocrine: Negative for cold intolerance and heat intolerance.   Psychiatric/Behavioral: Negative for decreased concentration. The patient is not nervous/anxious.        Objective:      Physical Exam   Constitutional: He appears well-developed and well-nourished.   HENT:   Head: Normocephalic and atraumatic.   Cardiovascular: Normal rate, regular rhythm and normal heart sounds.   Pulmonary/Chest: Effort normal and breath sounds normal.   Psychiatric: He has a normal mood and affect.   Nursing note and vitals reviewed.      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 Essential hypertension    3. Hyperlipidemia, unspecified hyperlipidemia type        Plan:       Type II diabetes mellitus with neurological manifestations  -     CBC auto differential; Future; Expected date: 02/06/2020  -     Comprehensive metabolic panel; Future; Expected  date: 02/06/2020  -     Hemoglobin A1c; Future; Expected date: 02/06/2020  -     Lipid panel; Future; Expected date: 02/06/2020  -     TSH; Future; Expected date: 02/06/2020    Essential hypertension  -     CBC auto differential; Future; Expected date: 02/06/2020  -     Comprehensive metabolic panel; Future; Expected date: 02/06/2020  -     Hemoglobin A1c; Future; Expected date: 02/06/2020  -     Lipid panel; Future; Expected date: 02/06/2020  -     TSH; Future; Expected date: 02/06/2020    Hyperlipidemia, unspecified hyperlipidemia type  -     CBC auto differential; Future; Expected date: 02/06/2020  -     Comprehensive metabolic panel; Future; Expected date: 02/06/2020  -     Hemoglobin A1c; Future; Expected date: 02/06/2020  -     Lipid panel; Future; Expected date: 02/06/2020  -     TSH; Future; Expected date: 02/06/2020      htn stable  Diabetes stable  Lipid stable  F/u with neurology as planned    They elect 1 yr follow up.    Follow up in about 1 year (around 2/6/2021), or if symptoms worsen or fail to improve.

## 2020-02-23 PROBLEM — I24.9 ACS (ACUTE CORONARY SYNDROME): Status: ACTIVE | Noted: 2020-01-01

## 2020-02-23 PROBLEM — Z71.89 ACP (ADVANCE CARE PLANNING): Status: ACTIVE | Noted: 2020-01-01

## 2020-02-26 NOTE — TELEPHONE ENCOUNTER
----- Message from Atiya Roque sent at 2/26/2020  9:26 AM CST -----  Contact: Pt wife  Type: Sooner appointment request    Caller is requesting a sooner appointment.    Who Called: Pt wife  When is the first available appointment: 4/15/20  Symptoms: Pt had surgery to have a stint put in Monday 2/24, pt was advised to make a 2 week follow up w/ Dr. Heredia.  Best call back number: [phone]  Additional Information:      Please advise. Thank you

## 2020-03-02 NOTE — TELEPHONE ENCOUNTER
----- Message from Deidre Laughlin sent at 3/2/2020  9:31 AM CST -----  Contact: daughter Livier [phone]  Please call her regarding regression and symptoms. She has a question about a medication. Thank you!

## 2020-03-03 NOTE — TELEPHONE ENCOUNTER
Admitted 2/23 with chest pain.  Angiogram 2/24.  Stented.  No meds Sunday the 23rd.  All last week and still, he can only pivot, no walking.    Very difficult to transport him.    Need CT scan brain as Parkinson's Disease does not worsen this bad on it's own.    Call Livier with scheduling, results:  [phone]

## 2020-03-03 NOTE — TELEPHONE ENCOUNTER
Spoke to patient's daughter. She reports pt had an angiogram last week and heart attack. He missed his PD meds for 1 whole day.  She is concerned because she says he has regressed to the point of being unable to walk.    Daughter says pt. responded to his PD meds well last Thurs but on last Friday he became worse with his movement. He is currently unable to walk.    Asks if home health could be ordered with PT.  Also asks for additional PD med to be added that was mentioned at last appt.?    Currently on Cd 25/100mg 2 TABS PO TID

## 2020-03-03 NOTE — TELEPHONE ENCOUNTER
----- Message from Deidre Laughlin sent at 3/3/2020  8:43 AM CST -----  Contact: daughter Livier [phone]  He was in [facility] on 2/23, they did not give him his Parkinson's medication, so it has gotten him down.  They are having problems getting him up and walking.  She thinks that a new medication needs to be added.  Please call her.  Thank you!

## 2020-03-06 NOTE — TELEPHONE ENCOUNTER
----- Message from Belinda Samson sent at 3/6/2020  9:06 AM CST -----  Type: Needs Medical Advice    Who Called: Daughter (Livier)  Best Call Back Number: [phone]  Additional Information: Daughter stated she was suppose to hear from doctor concerning patient's test results and medication/has not heard from anyone/please call back to advise.

## 2020-03-06 NOTE — TELEPHONE ENCOUNTER
IMPRESSION  No mass effect, displaced fracture or layering hemorrhage is appreciated. No significant interval intracranial changes are appreciated.

## 2020-03-09 NOTE — ASSESSMENT & PLAN NOTE
S/p CABG (1985 and 2003)  Angiogram (2/24/20):  Left Main   The vessel was visualized by angiography, is moderate in size and is angiographically normal.   Left Anterior Descending   The vessel was visualized by angiography. Occluded at the origin   Ramus Intermedius   The vessel was visualized by angiography. There is a medium-sized vessel with an 80-90% stenosis in the proximal segment. The rest of the vessel is free of significant disease   Left Circumflex   The vessel was visualized by angiography, is moderate in size and is angiographically normal.   Right Coronary Artery   The vessel was visualized by angiography. This is a medium-sized dominant vessel that is subtotally occluded in the proximal to mid segment   LIMA Graft to Mid LAD   The graft was visualized by angiography and is moderate in size. The graft is angiographically normal. The ongoing LAD is medium to small-sized vessel diffusely disease with no critical lesions   Graft to RPDA   The graft was visualized by angiography and is moderate in size. The graft is angiographically normal. The ongoing PDA is a medium-sized vessel that is free of significant disease and fills the RCA retrogradely   · Successful PCI of the a significant lesion of the proximal ramus intermedius with drug-eluting stent (2.5 x 22) with 0% residual stenosis and MARILYN 3 flow in distal vessel  On ASA, Plavix, and statin

## 2020-03-09 NOTE — PROGRESS NOTES
Subjective:    Patient ID:  William Mac is a 84 y.o. male who presents for follow-up of CAD.    HPI  Mr. Mac is a very pleasant gentleman who was recently hospitalized with Chest Pain and was found to have a NSTEMI. He underwent angiography with PCI of the proximal ramus (BREE) on 2/24/19. He did well post-procedure and has since been discharged. Per his daughter, his chief limitation is his Parkinson's. She states his Sinemet was held during his hospitalization and he has been unable to walk with generalized weakness and confusion since discharge. He is working with his neurologist closely.     He denies CP, DEL VALLE, PND, orthopnea, or LE edema. His daughter is concerned about his statin because he was previously on pravastatin and it caused weakness in his legs.     Review of Systems   Constitution: Positive for malaise/fatigue. Negative for chills, diaphoresis, fever, weight gain and weight loss.   HENT: Negative for sore throat.    Eyes: Negative for blurred vision, vision loss in left eye, vision loss in right eye and visual disturbance.   Cardiovascular: Negative for chest pain, claudication, dyspnea on exertion, leg swelling, near-syncope, orthopnea, palpitations, paroxysmal nocturnal dyspnea and syncope.   Respiratory: Negative for cough, hemoptysis, shortness of breath, sputum production and wheezing.    Endocrine: Negative for cold intolerance and heat intolerance.   Hematologic/Lymphatic: Negative for adenopathy. Does not bruise/bleed easily.   Skin: Negative for rash.   Musculoskeletal: Positive for muscle weakness. Negative for falls and myalgias.   Gastrointestinal: Negative for abdominal pain, change in bowel habit, constipation, diarrhea, melena and nausea.   Genitourinary: Negative for bladder incontinence.   Neurological: Positive for difficulty with concentration, disturbances in coordination and weakness. Negative for dizziness, focal weakness, headaches, light-headedness and numbness.    Psychiatric/Behavioral: Negative for altered mental status.         Vitals:    03/09/20 1344   BP: (!) 100/57   BP Location: Left arm   Patient Position: Sitting   BP Method: Medium (Manual)   Pulse: 64   Resp: 16   Weight: 89.3 kg (196 lb 13.9 oz)   Height: 6' (1.829 m)   Body mass index is 26.7 kg/m².    Objective:    Physical Exam   Constitutional: He is oriented to person, place, and time. He appears well-developed and well-nourished.   HENT:   Head: Normocephalic and atraumatic.   Eyes: Pupils are equal, round, and reactive to light. EOM are normal.   Neck: Neck supple. No JVD present. No tracheal deviation present. No thyromegaly present.   Cardiovascular: Normal rate, regular rhythm, S1 normal, S2 normal, intact distal pulses and normal pulses. PMI is not displaced. Exam reveals no gallop and no friction rub.   No murmur heard.  Pulmonary/Chest: Effort normal and breath sounds normal. No respiratory distress. He has no wheezes. He has no rales. He exhibits no tenderness.   Abdominal: Soft. Bowel sounds are normal. He exhibits no distension and no mass. There is no tenderness.   Musculoskeletal: He exhibits no edema or tenderness.   Neurological: He is alert and oriented to person, place, and time.   Skin: Skin is warm and dry. No rash noted.   Psychiatric: His behavior is normal. His affect is blunt.         Assessment:       Parkinson's disease  On Sinemet.   Follows with Neurology.     Diabetic polyneuropathy associated with type 2 diabetes mellitus  Lab Results   Component Value Date    HGBA1C 6.8 (H) 01/30/2020   On metformin.       CAD (coronary artery disease)  S/p CABG (1985 and 2003)  Angiogram (2/24/20):  Left Main   The vessel was visualized by angiography, is moderate in size and is angiographically normal.   Left Anterior Descending   The vessel was visualized by angiography. Occluded at the origin   Ramus Intermedius   The vessel was visualized by angiography. There is a medium-sized vessel  with an 80-90% stenosis in the proximal segment. The rest of the vessel is free of significant disease   Left Circumflex   The vessel was visualized by angiography, is moderate in size and is angiographically normal.   Right Coronary Artery   The vessel was visualized by angiography. This is a medium-sized dominant vessel that is subtotally occluded in the proximal to mid segment   LIMA Graft to Mid LAD   The graft was visualized by angiography and is moderate in size. The graft is angiographically normal. The ongoing LAD is medium to small-sized vessel diffusely disease with no critical lesions   Graft to RPDA   The graft was visualized by angiography and is moderate in size. The graft is angiographically normal. The ongoing PDA is a medium-sized vessel that is free of significant disease and fills the RCA retrogradely   · Successful PCI of the a significant lesion of the proximal ramus intermedius with drug-eluting stent (2.5 x 22) with 0% residual stenosis and MARILYN 3 flow in distal vessel  On ASA, Plavix, and statin    Essential hypertension  Well controlled on current regimen.          Plan:       Continue current cardiac medications.   Discussed diet and exercise.   F/U with Dr. Heredia in 3-4 months with lipids and LFTs at that time.

## 2020-03-09 NOTE — TELEPHONE ENCOUNTER
Spoke with Livier.  He is doing a little better since this weekend, since he started drinking alkaline water.    Making slow progress.  Agree to hold off on any increases in carbidopa-levodopa.

## 2020-03-12 NOTE — PROGRESS NOTES
Health Maintenance Due   Topic Date Due    Shingles Vaccine (1 of 2) 08/26/1986    Abdominal Aortic Aneurysm Screening  08/26/2001    Urine Microalbumin  09/21/2019     Chart review completed 07/23/2019  Future Appointments   Date Time Provider Department Center   8/2/2019 11:00 AM Erin Verde DPM [facility] POD [place]   8/6/2019  3:20 PM SAM Wen MD [facility] FAM MED [place]   8/8/2019  3:00 PM Lindsay Mccann MD [facility] NEURO [place]        Left message to call back to discuss symptoms

## 2020-05-12 NOTE — TELEPHONE ENCOUNTER
----- Message from Ese Scott sent at 5/12/2020 11:34 AM CDT -----  Contact: Daughter  Type: Needs Medical Advice  Who Called: Livierrah Millan Call Back Number: [phone] call this number first  Additional Information:the pt would like to cancel his appt per daughter and she would like to speak to the nurse about may be doing a phone call if that is applicable please call to advise

## 2020-05-12 NOTE — TELEPHONE ENCOUNTER
Spoke with Livier and informed message will be sent to Dr. Mccann staff to reschedule appt. Verbalized understanding.

## 2020-05-13 NOTE — TELEPHONE ENCOUNTER
----- Message from Mariela Medina sent at 5/13/2020  9:10 AM CDT -----  Contact: Patient Daughter Livier Change Appt Setting   Type: Needs Medical Advice  Who Called:  Livier  Monty Call Back Number: [phone]  Additional Information: patient is requesting a call back to change appt to audio. Please vein back

## 2020-05-22 NOTE — TELEPHONE ENCOUNTER
----- Message from Fern Wynne sent at 5/22/2020  8:48 AM CDT -----  Pt daughter called to see if pt can receive home healthcare to come to give him a bath. Daughter asked if possible can they use [facility] and request Stefani Kaur.  Pt daughter asked for a call back.      Pt daughter Lorie contact # [phone].      Thanks

## 2020-08-03 NOTE — TELEPHONE ENCOUNTER
Pt daughter is asking for hospice she stated that his parkinson's has worsen and her mother is not able to care for his needs. Please advise

## 2020-08-03 NOTE — TELEPHONE ENCOUNTER
----- Message from Dae Matias sent at 8/3/2020  8:05 AM CDT -----  Type: Needs Medical Advice    Who Called:  Abbie Schofield (Daughter)  Best Call Back Number: [phone]  Additional Information: Caller would like to request hospice for the patient. Please call to advise. Thanks!

## 2020-08-06 NOTE — TELEPHONE ENCOUNTER
----- Message from Mariela Medina sent at 8/6/2020  9:38 AM CDT -----  Regarding: Hospice Referral  Contact: [phone]  Type: Needs Medical Advice  Who Called:  Lorie   Monty Call Back Number: [phone]  Additional Information: Patient daughter is requesting a call back in regards to hospice orders process. Please and thank you

## 2020-08-18 NOTE — PROGRESS NOTES
Health Maintenance Due   Topic Date Due    Shingles Vaccine (1 of 2) 08/26/1986    Colonoscopy  05/21/2017    Eye Exam  10/22/2019    Foot Exam  05/24/2020    Hemoglobin A1c  07/30/2020     Updates were requested from care everywhere.  Chart was reviewed for overdue Proactive [facility] Encounters ([facility]) topics (CRS, Breast Cancer Screening, Eye exam)  Health Maintenance has been updated.  LINKS immunization registry triggered.  Immunizations were reconciled.

## 2020-08-20 NOTE — TELEPHONE ENCOUNTER
----- Message from Radha Marques sent at 8/20/2020 11:19 AM CDT -----  Contact: alejandro with [facility]  alejandro with [facility] ph# [phone], requesting for Dr. Wen to complete the certify of terminal illness, that's in his in-basket.

## 2020-12-14 ENCOUNTER — TELEPHONE (OUTPATIENT)
Dept: FAMILY MEDICINE | Facility: CLINIC | Age: 85
End: 2020-12-14

## 2020-12-14 NOTE — TELEPHONE ENCOUNTER
----- Message from Radha Baltazar sent at 12/14/2020 12:13 PM CST -----  Contact: Sharon beck/ [facility]  Patient passed away on hospice on 12/11.  Call back at [phone].  Thanks

## 2022-01-01 NOTE — TELEPHONE ENCOUNTER
----- Message from Amparo Epps sent at 7/26/2018  4:13 PM CDT -----  Type:  RX Refill Request    Who Called:  [facility]  Refill or New Rx:  Refill  RX Name and Strength: loratadine (CLARITIN) 10 mg tablet    How is the patient currently taking it? (ex. 1XDay):   1xday  Is this a 30 day or 90 day RX:  90  Preferred Pharmacy with phone number:    [facility] - [address]  [address]  [place] [address]  Phone: [phone] Fax: [phone]  Local or Mail Order:  Local  Ordering Provider:  Donna Millan Call Back Number:  [phone]      
Needs to est; sha 3 more until appt has been completed  
Refill request. Has upcoming appt with Dr. Wen. Please advise.   
258 Hour(s) 9 Minute(s)

## 2023-06-22 NOTE — LETTER
January 5, 2018    Dr Viki Toth - Internal Medicine  [address] [facility] [address] [place] [address]  Phone: [phone]  Fax: [phone] January 5, 2018     Patient: William Mac    YOB: 1935   Date of Visit: 12/27/2017       To Whom It May Concern:                                                                                    [facility]    We are seeing William Mac in the clinic today at [facility] [place] [facility].  Quita Thompson MD is their PCP.  She/He has an outstanding lab/procedure at this time when reviewing their chart.  To help with our Health Maintenance records will you please supply the following:       [x]  Eye Exam                                           Please Fax to [facility] [place] [facility] at [phone]    Thank you for your help, Itzel Morales LPN-GERALD.  If I can be of any assistance you can call at [phone]        [facility]         If you have any questions or concerns, please don't hesitate to call.    Sincerely,        Quita Thompson MD      No